# Patient Record
Sex: FEMALE | Race: WHITE | Employment: FULL TIME | URBAN - METROPOLITAN AREA
[De-identification: names, ages, dates, MRNs, and addresses within clinical notes are randomized per-mention and may not be internally consistent; named-entity substitution may affect disease eponyms.]

---

## 2017-06-13 ENCOUNTER — OFFICE VISIT (OUTPATIENT)
Dept: INTERNAL MEDICINE CLINIC | Facility: CLINIC | Age: 52
End: 2017-06-13

## 2017-06-13 VITALS
WEIGHT: 151.5 LBS | TEMPERATURE: 98 F | HEIGHT: 65 IN | DIASTOLIC BLOOD PRESSURE: 60 MMHG | HEART RATE: 80 BPM | BODY MASS INDEX: 25.24 KG/M2 | SYSTOLIC BLOOD PRESSURE: 118 MMHG | RESPIRATION RATE: 20 BRPM

## 2017-06-13 DIAGNOSIS — C50.911 MALIGNANT NEOPLASM OF RIGHT BREAST IN FEMALE, ESTROGEN RECEPTOR POSITIVE, UNSPECIFIED SITE OF BREAST (HCC): ICD-10-CM

## 2017-06-13 DIAGNOSIS — Z13.820 SCREENING FOR OSTEOPOROSIS: ICD-10-CM

## 2017-06-13 DIAGNOSIS — Z00.00 ROUTINE GENERAL MEDICAL EXAMINATION AT A HEALTH CARE FACILITY: Primary | ICD-10-CM

## 2017-06-13 DIAGNOSIS — Z17.0 MALIGNANT NEOPLASM OF RIGHT BREAST IN FEMALE, ESTROGEN RECEPTOR POSITIVE, UNSPECIFIED SITE OF BREAST (HCC): ICD-10-CM

## 2017-06-13 DIAGNOSIS — F51.01 PRIMARY INSOMNIA: ICD-10-CM

## 2017-06-13 DIAGNOSIS — M06.9 RHEUMATOID ARTHRITIS INVOLVING MULTIPLE SITES, UNSPECIFIED RHEUMATOID FACTOR PRESENCE: ICD-10-CM

## 2017-06-13 DIAGNOSIS — F39 MOOD DISORDER (HCC): ICD-10-CM

## 2017-06-13 PROCEDURE — 99386 PREV VISIT NEW AGE 40-64: CPT | Performed by: INTERNAL MEDICINE

## 2017-06-13 NOTE — PROGRESS NOTES
Patient presents with:  Establish Care: CPX      HPI: The pt is a 48 y/o WF, new patient, here to establish PCP and to undergo the female CPX minus the pap and minus the CBE. She is doing well.   Health goals center around staying healthy while improving f 30 capsule, Rfl: 0  •  HUMIRA 40 MG/0.8ML Subcutaneous Prefilled Syringe Kit, , Disp: , Rfl: 4  •  ibuprofen (MOTRIN) 800 MG Oral Tab, Take 800 mg by mouth every 6 (six) hours as needed. , Disp: , Rfl:       Smoking Status: Never Smoker regular and dynamic exercise, maintenance of ideal body weight, and reduction in daily stressors while making themselves more resilient in their everyday functioning to the best of their ability.   Patient verbally agrees to and understands the plan as outl

## 2017-07-05 ENCOUNTER — LAB ENCOUNTER (OUTPATIENT)
Dept: LAB | Age: 52
End: 2017-07-05
Attending: INTERNAL MEDICINE
Payer: COMMERCIAL

## 2017-07-05 DIAGNOSIS — Z00.00 ROUTINE GENERAL MEDICAL EXAMINATION AT A HEALTH CARE FACILITY: ICD-10-CM

## 2017-07-05 LAB
25-HYDROXYVITAMIN D (TOTAL): 31.4 NG/ML (ref 30–100)
ALBUMIN SERPL-MCNC: 3.7 G/DL (ref 3.5–4.8)
ALP LIVER SERPL-CCNC: 61 U/L (ref 41–108)
ALT SERPL-CCNC: 19 U/L (ref 14–54)
AST SERPL-CCNC: 16 U/L (ref 15–41)
BASOPHILS # BLD AUTO: 0.05 X10(3) UL (ref 0–0.1)
BASOPHILS NFR BLD AUTO: 1.1 %
BILIRUB SERPL-MCNC: 1.4 MG/DL (ref 0.1–2)
BILIRUB UR QL STRIP.AUTO: NEGATIVE
BUN BLD-MCNC: 12 MG/DL (ref 8–20)
CALCIUM BLD-MCNC: 8.4 MG/DL (ref 8.3–10.3)
CHLORIDE: 108 MMOL/L (ref 101–111)
CHOLEST SMN-MCNC: 157 MG/DL (ref ?–200)
CO2: 27 MMOL/L (ref 22–32)
COLOR UR AUTO: YELLOW
CREAT BLD-MCNC: 1.13 MG/DL (ref 0.55–1.02)
EOSINOPHIL # BLD AUTO: 0.16 X10(3) UL (ref 0–0.3)
EOSINOPHIL NFR BLD AUTO: 3.7 %
ERYTHROCYTE [DISTWIDTH] IN BLOOD BY AUTOMATED COUNT: 12.1 % (ref 11.5–16)
EST. AVERAGE GLUCOSE BLD GHB EST-MCNC: 103 MG/DL (ref 68–126)
GLUCOSE BLD-MCNC: 85 MG/DL (ref 70–99)
GLUCOSE UR STRIP.AUTO-MCNC: NEGATIVE MG/DL
HBA1C MFR BLD HPLC: 5.2 % (ref ?–5.7)
HCT VFR BLD AUTO: 38 % (ref 34–50)
HDLC SERPL-MCNC: 77 MG/DL (ref 45–?)
HDLC SERPL: 2.04 {RATIO} (ref ?–4.44)
HGB BLD-MCNC: 12.3 G/DL (ref 12–16)
IMMATURE GRANULOCYTE COUNT: 0.01 X10(3) UL (ref 0–1)
IMMATURE GRANULOCYTE RATIO %: 0.2 %
KETONES UR STRIP.AUTO-MCNC: NEGATIVE MG/DL
LDLC SERPL CALC-MCNC: 68 MG/DL (ref ?–130)
LEUKOCYTE ESTERASE UR QL STRIP.AUTO: NEGATIVE
LYMPHOCYTES # BLD AUTO: 1.34 X10(3) UL (ref 0.9–4)
LYMPHOCYTES NFR BLD AUTO: 30.7 %
M PROTEIN MFR SERPL ELPH: 7 G/DL (ref 6.1–8.3)
MCH RBC QN AUTO: 30.1 PG (ref 27–33.2)
MCHC RBC AUTO-ENTMCNC: 32.4 G/DL (ref 31–37)
MCV RBC AUTO: 93.1 FL (ref 81–100)
MONOCYTES # BLD AUTO: 0.35 X10(3) UL (ref 0.1–0.6)
MONOCYTES NFR BLD AUTO: 8 %
NEUTROPHIL ABS PRELIM: 2.45 X10 (3) UL (ref 1.3–6.7)
NEUTROPHILS # BLD AUTO: 2.45 X10(3) UL (ref 1.3–6.7)
NEUTROPHILS NFR BLD AUTO: 56.3 %
NITRITE UR QL STRIP.AUTO: NEGATIVE
NONHDLC SERPL-MCNC: 80 MG/DL (ref ?–130)
PH UR STRIP.AUTO: 5 [PH] (ref 4.5–8)
PLATELET # BLD AUTO: 182 10(3)UL (ref 150–450)
POTASSIUM SERPL-SCNC: 4 MMOL/L (ref 3.6–5.1)
PROT UR STRIP.AUTO-MCNC: NEGATIVE MG/DL
RBC # BLD AUTO: 4.08 X10(6)UL (ref 3.8–5.1)
RED CELL DISTRIBUTION WIDTH-SD: 41.5 FL (ref 35.1–46.3)
SODIUM SERPL-SCNC: 140 MMOL/L (ref 136–144)
SP GR UR STRIP.AUTO: 1.02 (ref 1–1.03)
TRIGLYCERIDES: 60 MG/DL (ref ?–150)
TSI SER-ACNC: 0.75 MIU/ML (ref 0.35–5.5)
UROBILINOGEN UR STRIP.AUTO-MCNC: <2 MG/DL
VLDL: 12 MG/DL (ref 5–40)
WBC # BLD AUTO: 4.4 X10(3) UL (ref 4–13)

## 2017-07-05 PROCEDURE — 82306 VITAMIN D 25 HYDROXY: CPT

## 2017-07-05 PROCEDURE — 84443 ASSAY THYROID STIM HORMONE: CPT

## 2017-07-05 PROCEDURE — 36415 COLL VENOUS BLD VENIPUNCTURE: CPT

## 2017-07-05 PROCEDURE — 80053 COMPREHEN METABOLIC PANEL: CPT

## 2017-07-05 PROCEDURE — 81001 URINALYSIS AUTO W/SCOPE: CPT

## 2017-07-05 PROCEDURE — 80061 LIPID PANEL: CPT

## 2017-07-05 PROCEDURE — 83036 HEMOGLOBIN GLYCOSYLATED A1C: CPT

## 2017-07-05 PROCEDURE — 85025 COMPLETE CBC W/AUTO DIFF WBC: CPT

## 2017-07-06 DIAGNOSIS — R31.21 ASYMPTOMATIC MICROSCOPIC HEMATURIA: ICD-10-CM

## 2017-07-06 DIAGNOSIS — R79.89 ELEVATED SERUM CREATININE: Primary | ICD-10-CM

## 2017-07-06 NOTE — PROGRESS NOTES
Repeat labs ordered for BUN and creatinine (b/c of elevated serum creatinine) and for U/A (b/c of microscopic hematuria). Jailene Underwood. Nicola Magallanes MD  Diplomate, American Board of Internal Medicine  [facility]  [address], [address], [facility] & [facility],

## 2017-07-10 ENCOUNTER — TELEPHONE (OUTPATIENT)
Dept: INTERNAL MEDICINE CLINIC | Facility: CLINIC | Age: 52
End: 2017-07-10

## 2017-07-10 DIAGNOSIS — C50.911 MALIGNANT NEOPLASM OF RIGHT BREAST IN FEMALE, ESTROGEN RECEPTOR POSITIVE, UNSPECIFIED SITE OF BREAST (HCC): Primary | ICD-10-CM

## 2017-07-10 DIAGNOSIS — Z17.0 MALIGNANT NEOPLASM OF RIGHT BREAST IN FEMALE, ESTROGEN RECEPTOR POSITIVE, UNSPECIFIED SITE OF BREAST (HCC): Primary | ICD-10-CM

## 2017-07-10 NOTE — TELEPHONE ENCOUNTER
Diagnostic mammogram ordered, notify pt. Diana Chaparro. Keri Guzman MD  Diplomate, American Board of Internal Medicine  [facility]  [address]  [facility],4Th Floor, [address], [facility], [address] [place] [address]  T: [phone]; F: Rosalinda 5

## 2017-08-02 ENCOUNTER — APPOINTMENT (OUTPATIENT)
Dept: LAB | Age: 52
End: 2017-08-02
Attending: INTERNAL MEDICINE
Payer: COMMERCIAL

## 2017-08-02 DIAGNOSIS — R79.89 ELEVATED SERUM CREATININE: ICD-10-CM

## 2017-08-02 DIAGNOSIS — R31.21 ASYMPTOMATIC MICROSCOPIC HEMATURIA: ICD-10-CM

## 2017-08-02 LAB
BILIRUB UR QL STRIP.AUTO: NEGATIVE
BUN BLD-MCNC: 14 MG/DL (ref 8–20)
COLOR UR AUTO: YELLOW
CREAT BLD-MCNC: 1.13 MG/DL (ref 0.55–1.02)
GLUCOSE UR STRIP.AUTO-MCNC: NEGATIVE MG/DL
HYALINE CASTS #/AREA URNS AUTO: PRESENT /LPF
KETONES UR STRIP.AUTO-MCNC: NEGATIVE MG/DL
LEUKOCYTE ESTERASE UR QL STRIP.AUTO: NEGATIVE
NITRITE UR QL STRIP.AUTO: NEGATIVE
PH UR STRIP.AUTO: 5 [PH] (ref 4.5–8)
PROT UR STRIP.AUTO-MCNC: NEGATIVE MG/DL
SP GR UR STRIP.AUTO: 1.02 (ref 1–1.03)
UROBILINOGEN UR STRIP.AUTO-MCNC: <2 MG/DL

## 2017-08-02 PROCEDURE — 84520 ASSAY OF UREA NITROGEN: CPT

## 2017-08-02 PROCEDURE — 36415 COLL VENOUS BLD VENIPUNCTURE: CPT

## 2017-08-02 PROCEDURE — 81001 URINALYSIS AUTO W/SCOPE: CPT

## 2017-08-02 PROCEDURE — 82565 ASSAY OF CREATININE: CPT

## 2017-08-04 ENCOUNTER — TELEPHONE (OUTPATIENT)
Dept: INTERNAL MEDICINE CLINIC | Facility: CLINIC | Age: 52
End: 2017-08-04

## 2017-08-04 NOTE — TELEPHONE ENCOUNTER
Pt called for test results. Please review. Pt c/o burning upon voiding. No c/o pain, hematuria or frequency.

## 2017-08-04 NOTE — TELEPHONE ENCOUNTER
Patient notified via [address] Sterling Juan. Graham Morales MD  Diplomate, American Board of Internal Medicine  [facility]  [address]  Counts include 234 beds at the [facility],4Th Floor, [address], [facility] & [facility], [address] [place] [address]  T: [phone]; F: Rosalinda 5

## 2017-08-24 ENCOUNTER — HOSPITAL ENCOUNTER (OUTPATIENT)
Dept: BONE DENSITY | Age: 52
Discharge: HOME | End: 2017-08-24
Attending: INTERNAL MEDICINE
Payer: COMMERCIAL

## 2017-08-24 ENCOUNTER — HOSPITAL ENCOUNTER (OUTPATIENT)
Dept: MAMMOGRAPHY | Age: 52
Discharge: HOME | End: 2017-08-24
Attending: INTERNAL MEDICINE
Payer: COMMERCIAL

## 2017-08-24 DIAGNOSIS — C50.911 MALIGNANT NEOPLASM OF RIGHT BREAST IN FEMALE, ESTROGEN RECEPTOR POSITIVE, UNSPECIFIED SITE OF BREAST (HCC): ICD-10-CM

## 2017-08-24 DIAGNOSIS — Z17.0 MALIGNANT NEOPLASM OF RIGHT BREAST IN FEMALE, ESTROGEN RECEPTOR POSITIVE, UNSPECIFIED SITE OF BREAST (HCC): ICD-10-CM

## 2017-08-24 DIAGNOSIS — M06.9 RHEUMATOID ARTHRITIS INVOLVING MULTIPLE SITES, UNSPECIFIED RHEUMATOID FACTOR PRESENCE: ICD-10-CM

## 2017-08-24 DIAGNOSIS — Z13.820 SCREENING FOR OSTEOPOROSIS: ICD-10-CM

## 2017-08-24 PROCEDURE — 77080 DXA BONE DENSITY AXIAL: CPT | Performed by: INTERNAL MEDICINE

## 2017-08-24 PROCEDURE — 77062 BREAST TOMOSYNTHESIS BI: CPT | Performed by: INTERNAL MEDICINE

## 2017-08-24 PROCEDURE — 77066 DX MAMMO INCL CAD BI: CPT | Performed by: INTERNAL MEDICINE

## 2017-09-07 PROCEDURE — 81015 MICROSCOPIC EXAM OF URINE: CPT | Performed by: UROLOGY

## 2017-09-07 RX ORDER — TEMAZEPAM 30 MG/1
CAPSULE ORAL
Qty: 30 CAPSULE | Refills: 0 | Status: SHIPPED | OUTPATIENT
Start: 2017-09-07 | End: 2017-12-21

## 2017-11-15 ENCOUNTER — OFFICE VISIT (OUTPATIENT)
Dept: FAMILY MEDICINE CLINIC | Facility: CLINIC | Age: 52
End: 2017-11-15

## 2017-11-15 VITALS
HEART RATE: 86 BPM | DIASTOLIC BLOOD PRESSURE: 74 MMHG | WEIGHT: 145 LBS | SYSTOLIC BLOOD PRESSURE: 114 MMHG | TEMPERATURE: 98 F | BODY MASS INDEX: 23.3 KG/M2 | RESPIRATION RATE: 16 BRPM | HEIGHT: 66 IN | OXYGEN SATURATION: 98 %

## 2017-11-15 DIAGNOSIS — J01.10 ACUTE NON-RECURRENT FRONTAL SINUSITIS: ICD-10-CM

## 2017-11-15 DIAGNOSIS — J04.0 LARYNGITIS: Primary | ICD-10-CM

## 2017-11-15 DIAGNOSIS — R05.9 COUGH: ICD-10-CM

## 2017-11-15 PROCEDURE — 99213 OFFICE O/P EST LOW 20 MIN: CPT | Performed by: PHYSICIAN ASSISTANT

## 2017-11-15 RX ORDER — METHYLPREDNISOLONE 4 MG/1
TABLET ORAL
Qty: 1 KIT | Refills: 0 | Status: SHIPPED | OUTPATIENT
Start: 2017-11-15 | End: 2017-12-19 | Stop reason: ALTCHOICE

## 2017-11-15 RX ORDER — BENZONATATE 200 MG/1
200 CAPSULE ORAL 3 TIMES DAILY PRN
Qty: 30 CAPSULE | Refills: 0 | Status: SHIPPED | OUTPATIENT
Start: 2017-11-15 | End: 2017-11-25

## 2017-11-15 RX ORDER — AMOXICILLIN AND CLAVULANATE POTASSIUM 875; 125 MG/1; MG/1
1 TABLET, FILM COATED ORAL 2 TIMES DAILY
Qty: 20 TABLET | Refills: 0 | Status: SHIPPED | OUTPATIENT
Start: 2017-11-15 | End: 2017-11-25

## 2017-11-15 NOTE — PROGRESS NOTES
CHIEF COMPLAINT:   Patient presents with:  Cough: loss of voice since Friday, Nasal congestion. HPI:   Emily Malik is a 47year old female who presents for URi sxs for  5 days.   Patient reports chills and sweats (improving), hoarse voice, sinus HA Comment: Needle Localization Right Breast Lumpectomy  No date: MELBA BIOPSY STEREOTACTIC NODULE 1 SITE RIGHT      Comment: 2014 2011: OTHER SURGICAL HISTORY      Comment: Cardiac ablation  2005: OTHER SURGICAL HISTORY      Comment: Cervical ablation  14 LYMPH:  No anterior cervical lymphadenopathy. No submandibular lymphadenopathy. No posterior cervical or occipital lymphadenopathy. No results found for this or any previous visit (from the past 24 hour(s)).     ASSESSMENT AND PLAN:   Leighann Vazquez is · Drink plenty of fluids to stay well hydrated. · Do not smoke  Follow-up care  Follow up with your healthcare provider or this facility if you have not improved after one week.   When to seek medical advice  Contact your healthcare provider for any of the Ty Walls will ask about your symptoms and health history. He or she will look at your ear, nose, and throat. You usually won't need to have X-rays taken.    The doctor may take a sample of mucus to check for bacteria.  If you have sinusitis that keeps co

## 2017-11-15 NOTE — PATIENT INSTRUCTIONS
-Cool mist humidifier at night  -Warm tea with honey  -Mucinex-D  -Flonase  -tylenol if needed for pain  -Go to ER with any worsening symptoms  -Antibiotic in 3 days if symptoms are not improving. Suspect viral at this point.           Laryngitis    Laryng Sinuses are air-filled spaces in the skull behind the face. They are kept moist and clean by a lining of mucosa. Things such as pollen, smoke, and chemical fumes can irritate the mucosa. It can then swell up.  As a response to irritation, the mucosa makes m © 0133-0944 The [address] [place], [facility] [place]. All rights reserved. This information is not intended as a substitute for professional medical care. Always follow your healthcare professional's instructions.

## 2017-12-19 ENCOUNTER — TELEPHONE (OUTPATIENT)
Dept: INTERNAL MEDICINE CLINIC | Facility: CLINIC | Age: 52
End: 2017-12-19

## 2017-12-19 PROCEDURE — 87624 HPV HI-RISK TYP POOLED RSLT: CPT | Performed by: OBSTETRICS & GYNECOLOGY

## 2017-12-19 PROCEDURE — 88175 CYTOPATH C/V AUTO FLUID REDO: CPT | Performed by: OBSTETRICS & GYNECOLOGY

## 2017-12-19 NOTE — TELEPHONE ENCOUNTER
Pt stated that she was seen by OBGYN today and discussed fluctuation in moods. OBGYN suggested that since pt is already taking Wellbutrin she be placed on a SSRI.      FYI she wanted to remind you that she can not take anything with estrogen d/t dx of b

## 2017-12-20 NOTE — TELEPHONE ENCOUNTER
Call patient. I last saw her in 6/2017. She would need an OV for f/u mood disorder to discuss options and other therapies. Can schedule w/ Ame Hilliard.   Please also gauge her interest in working w/ [facility] as cognitive behavioral therapy + prescription medicati

## 2017-12-21 RX ORDER — TEMAZEPAM 30 MG/1
CAPSULE ORAL
Qty: 30 CAPSULE | Refills: 0 | Status: SHIPPED | OUTPATIENT
Start: 2017-12-21 | End: 2018-03-27

## 2018-03-14 ENCOUNTER — APPOINTMENT (OUTPATIENT)
Dept: CT IMAGING | Age: 53
End: 2018-03-14
Attending: EMERGENCY MEDICINE
Payer: COMMERCIAL

## 2018-03-14 ENCOUNTER — HOSPITAL ENCOUNTER (EMERGENCY)
Age: 53
Discharge: HOME | End: 2018-03-14
Attending: EMERGENCY MEDICINE
Payer: COMMERCIAL

## 2018-03-14 VITALS
TEMPERATURE: 98 F | WEIGHT: 142 LBS | HEIGHT: 66 IN | RESPIRATION RATE: 16 BRPM | SYSTOLIC BLOOD PRESSURE: 126 MMHG | OXYGEN SATURATION: 99 % | DIASTOLIC BLOOD PRESSURE: 83 MMHG | HEART RATE: 85 BPM | BODY MASS INDEX: 22.82 KG/M2

## 2018-03-14 DIAGNOSIS — R06.00 DYSPNEA, UNSPECIFIED TYPE: Primary | ICD-10-CM

## 2018-03-14 LAB
ALBUMIN SERPL-MCNC: 3.8 G/DL (ref 3.5–4.8)
ALP LIVER SERPL-CCNC: 63 U/L (ref 41–108)
ALT SERPL-CCNC: 21 U/L (ref 14–54)
AST SERPL-CCNC: 17 U/L (ref 15–41)
ATRIAL RATE: 83 BPM
BASOPHILS # BLD AUTO: 0.06 X10(3) UL (ref 0–0.1)
BASOPHILS NFR BLD AUTO: 0.8 %
BILIRUB SERPL-MCNC: 1.1 MG/DL (ref 0.1–2)
BUN BLD-MCNC: 12 MG/DL (ref 8–20)
CALCIUM BLD-MCNC: 8.5 MG/DL (ref 8.3–10.3)
CHLORIDE: 105 MMOL/L (ref 101–111)
CO2: 25 MMOL/L (ref 22–32)
CREAT BLD-MCNC: 0.96 MG/DL (ref 0.55–1.02)
EOSINOPHIL # BLD AUTO: 0.24 X10(3) UL (ref 0–0.3)
EOSINOPHIL NFR BLD AUTO: 3.4 %
ERYTHROCYTE [DISTWIDTH] IN BLOOD BY AUTOMATED COUNT: 11.6 % (ref 11.5–16)
GLUCOSE BLD-MCNC: 91 MG/DL (ref 70–99)
HCT VFR BLD AUTO: 37.1 % (ref 34–50)
HGB BLD-MCNC: 12.8 G/DL (ref 12–16)
IMMATURE GRANULOCYTE COUNT: 0.02 X10(3) UL (ref 0–1)
IMMATURE GRANULOCYTE RATIO %: 0.3 %
LYMPHOCYTES # BLD AUTO: 2.25 X10(3) UL (ref 0.9–4)
LYMPHOCYTES NFR BLD AUTO: 31.5 %
M PROTEIN MFR SERPL ELPH: 7.2 G/DL (ref 6.1–8.3)
MCH RBC QN AUTO: 30.3 PG (ref 27–33.2)
MCHC RBC AUTO-ENTMCNC: 34.5 G/DL (ref 31–37)
MCV RBC AUTO: 87.9 FL (ref 81–100)
MONOCYTES # BLD AUTO: 0.35 X10(3) UL (ref 0.1–1)
MONOCYTES NFR BLD AUTO: 4.9 %
NEUTROPHIL ABS PRELIM: 4.23 X10 (3) UL (ref 1.3–6.7)
NEUTROPHILS # BLD AUTO: 4.23 X10(3) UL (ref 1.3–6.7)
NEUTROPHILS NFR BLD AUTO: 59.1 %
P AXIS: 67 DEGREES
P-R INTERVAL: 172 MS
PLATELET # BLD AUTO: 235 10(3)UL (ref 150–450)
POTASSIUM SERPL-SCNC: 3.8 MMOL/L (ref 3.6–5.1)
Q-T INTERVAL: 370 MS
QRS DURATION: 84 MS
QTC CALCULATION (BEZET): 434 MS
R AXIS: 80 DEGREES
RBC # BLD AUTO: 4.22 X10(6)UL (ref 3.8–5.1)
RED CELL DISTRIBUTION WIDTH-SD: 37.1 FL (ref 35.1–46.3)
SODIUM SERPL-SCNC: 139 MMOL/L (ref 136–144)
T AXIS: 41 DEGREES
TROPONIN: <0.046 NG/ML (ref ?–0.05)
VENTRICULAR RATE: 83 BPM
WBC # BLD AUTO: 7.2 X10(3) UL (ref 4–13)

## 2018-03-14 PROCEDURE — 93010 ELECTROCARDIOGRAM REPORT: CPT

## 2018-03-14 PROCEDURE — 99285 EMERGENCY DEPT VISIT HI MDM: CPT

## 2018-03-14 PROCEDURE — 93005 ELECTROCARDIOGRAM TRACING: CPT

## 2018-03-14 PROCEDURE — 36415 COLL VENOUS BLD VENIPUNCTURE: CPT

## 2018-03-14 PROCEDURE — 71275 CT ANGIOGRAPHY CHEST: CPT | Performed by: EMERGENCY MEDICINE

## 2018-03-14 PROCEDURE — 80053 COMPREHEN METABOLIC PANEL: CPT | Performed by: EMERGENCY MEDICINE

## 2018-03-14 PROCEDURE — 85025 COMPLETE CBC W/AUTO DIFF WBC: CPT | Performed by: EMERGENCY MEDICINE

## 2018-03-14 PROCEDURE — 84484 ASSAY OF TROPONIN QUANT: CPT | Performed by: EMERGENCY MEDICINE

## 2018-03-14 RX ORDER — CEPHALEXIN 500 MG/1
500 CAPSULE ORAL 4 TIMES DAILY
COMMUNITY
End: 2018-08-20 | Stop reason: ALTCHOICE

## 2018-03-14 RX ORDER — ASPIRIN 81 MG/1
324 TABLET, CHEWABLE ORAL ONCE
Status: COMPLETED | OUTPATIENT
Start: 2018-03-14 | End: 2018-03-14

## 2018-03-14 NOTE — ED PROVIDER NOTES
Patient Seen in: [facility] [place]    History   Patient presents with:  Chest Pain Angina (cardiovascular)    Stated Complaint: chest tightness/short of breath, recent abdom surgery    HPI    Patient is a 60-year-old female comes 14':  RADIATION RIGHT  3-4-14 [facility]: STEREOTACTIC BREAST BIOPSY      Comment: Right breast -- 12 o'clock position        Smoking status: Never Smoker                                                              Smokeless tobacco: Never Used I - Normal   CBC WITH DIFFERENTIAL WITH PLATELET    Narrative: The following orders were created for panel order CBC WITH DIFFERENTIAL WITH PLATELET.   Procedure                               Abnormality         Status                     --------- CONCLUSION:  1. Negative for pulmonary embolism. 2.  Limited opacification of the thoracic aorta. Dissection cannot be excluded. Normal caliber.     Dictated by: Gail Lowe MD on 3/14/2018 at 16:32     Approved by: Gail Lowe MD            ED Cours

## 2018-03-14 NOTE — ED INITIAL ASSESSMENT (HPI)
Reports chest tightness that started yesterday.  Recent abdominal surgery at [facility] on 2/28

## 2018-03-26 NOTE — TELEPHONE ENCOUNTER
Patient requesting refill for:  TEMAZEPAM 30 MG Oral Cap    Patient has been waiting for refill since 3/20. Request sent to incorrect pool.  Please address    To be sent to:  [address] [facility]

## 2018-03-27 NOTE — TELEPHONE ENCOUNTER
Temezepam 30 mg 1 cap daily filled 12-21-17 30 with 0 refills     LOV 6-13-17     Insomnia - Stable and no new issues. RTC 1 year or PRN.

## 2018-03-28 RX ORDER — TEMAZEPAM 30 MG/1
CAPSULE ORAL
Qty: 30 CAPSULE | Refills: 0 | Status: SHIPPED | OUTPATIENT
Start: 2018-03-28 | End: 2018-08-07

## 2018-03-28 RX ORDER — TEMAZEPAM 30 MG/1
CAPSULE ORAL
Qty: 30 CAPSULE | Refills: 0 | OUTPATIENT
Start: 2018-03-28

## 2018-04-09 ENCOUNTER — TELEPHONE (OUTPATIENT)
Dept: INTERNAL MEDICINE CLINIC | Facility: CLINIC | Age: 53
End: 2018-04-09

## 2018-04-09 NOTE — TELEPHONE ENCOUNTER
Pt c/o fatigue, chills and fever of 101.5 since yesterday. Pt with occassional non-productive cough. Taking otc Motrin. No c/o sore throat, ear pain, headache or n/v/d. Pt with no know exposure to illness.   Informed no appointments available today and

## 2018-04-09 NOTE — TELEPHONE ENCOUNTER
Patient called looking for nurse recommendation. Stated that she feels very ill and has had a fever since Saturday.  Please advise

## 2018-08-07 NOTE — TELEPHONE ENCOUNTER
Patient calling in requesting a refill for TEMAZEPAM 30 MG Oral Cap    To be sent to:   [address] [facility] [address], [address]), [phone], [phone]

## 2018-08-07 NOTE — TELEPHONE ENCOUNTER
Medication(s) to Refill:   Pending Prescriptions Disp Refills    temazepam 30 MG Oral Cap 30 capsule 0     Non protocol medication     Last Time Medication was Filled: 3/28/18 30 0R   Last Office Visit with PCP: 6/13/17   When Patient was Due Back to the O

## 2018-08-08 NOTE — TELEPHONE ENCOUNTER
Needs OV for Temazepam refill. Last OV w/ me was in 6/2017. Vincent Kay. Ani Wilson MD  Diplomate, American Board of Internal Medicine  [facility]  [address]  [facility],4Th Floor, [address], [facility], [address] [place] [address]  T: [phone]; F: Hafnargaeleti 5

## 2018-08-09 RX ORDER — TEMAZEPAM 30 MG/1
CAPSULE ORAL
Qty: 30 CAPSULE | Refills: 0 | Status: SHIPPED | OUTPATIENT
Start: 2018-08-09 | End: 2019-01-03

## 2018-08-20 NOTE — PATIENT INSTRUCTIONS
Danilo Dennis, great seeing you today! My show is called \"TO [address] [place] [address] DR. Lula Hearn" and it airs live on Facebook every Wednesday at 4 PM CST.   Podcast is available immediately thereafter on [employer] (search for [employer] WITH DR. Lula Hearn" within th

## 2018-08-20 NOTE — PROGRESS NOTES
Patient presents with:  Medication Follow-Up: Mood disorder      HPI: Francisco Marks presents today for f/u ongoing mood disorder.   She has been on Wellbutrin for quite some time now and with up-titration in dosing, but this medication has not resulted in sustained site of breast (hcc)    1. Mood disorder - D/C Wellbutrin. Start trial of Trintellix 5 mg daily. I gave her samples to last 2 weeks (she'll actually take 1/2 tablet of a 10 mg pill). I've instructed Emily to MyChart me in 2 weeks to give me a status.   Sherlyn Malone

## 2018-08-21 ENCOUNTER — PATIENT MESSAGE (OUTPATIENT)
Dept: INTERNAL MEDICINE CLINIC | Facility: CLINIC | Age: 53
End: 2018-08-21

## 2018-08-21 NOTE — TELEPHONE ENCOUNTER
From: Elsy Grande  To: Sunitha Villatoro MD  Sent: 8/21/2018 3:50 PM CDT  Subject: Prescription Question    Hi Dr. Manuel Olivas,   I started the 5mg of Trintellix you gave me yesterday.  I checked with my [facility] and unfortunately this is not a covered m

## 2018-08-22 NOTE — TELEPHONE ENCOUNTER
What about the Good Rx cards? Marisol Collado. Mely Alanis MD  Diplomate, American Board of Internal Medicine  [facility]  [address]  [facility],4Th Floor, [address], [facility] & [facility], [address] [place] [address]  T: [phone]; F: Rosalinda 5

## 2018-08-28 NOTE — TELEPHONE ENCOUNTER
Pt calling would like to know what she needs to do about Trintellix needs to know if she needs to change pharmacy in order to get medication approved

## 2018-08-28 NOTE — TELEPHONE ENCOUNTER
Pt was contacted. After Good Rx coupon medication will cost pt over $300. States she needs form filled out by Dr Gibson Hollins for financial assistance from . Fax # provided for Dr Gibson Hollins to take a look at the form.

## 2018-08-30 ENCOUNTER — TELEPHONE (OUTPATIENT)
Dept: INTERNAL MEDICINE CLINIC | Facility: CLINIC | Age: 53
End: 2018-08-30

## 2018-08-30 NOTE — TELEPHONE ENCOUNTER
Pt calling in to follow up on the form that was faxed over regarding a prescription that wasn't covered through pt's insurance. Pertaining to a discount card. Form was faxed on Monday, 08/27/18    RX: Trintellix       Please call pt with form status.

## 2018-09-11 ENCOUNTER — TELEPHONE (OUTPATIENT)
Dept: INTERNAL MEDICINE CLINIC | Facility: CLINIC | Age: 53
End: 2018-09-11

## 2018-09-11 NOTE — TELEPHONE ENCOUNTER
Would like to speak to nurse regarding her medication Trintellix, patient has questions regarding a discounted form that shes doing

## 2018-09-11 NOTE — TELEPHONE ENCOUNTER
Pt states Dr Tone Fermin portion on Trintellix form needs to be faxed to [place] from our office for them to except it. Faxed to [phone]. Confirmation received.

## 2018-09-12 NOTE — TELEPHONE ENCOUNTER
Received fax from patient assistance program with Clau Arndt stating patient is eligible to receive free medication through the program until 12/31/18 and will need to re enroll at that time.

## 2018-10-02 NOTE — PROGRESS NOTES
Patient presents with:  Medication Follow-Up      HPI: Amor Hicks presents today for f/u mood disorder. After her last visit w/ me, I started her on Trintellix 5 mg daily.   She's only had this medication for a full 2 weeks now as we were dealing with insurance nl mood/affect      A/P:  Mood disorder (hcc)  (primary encounter diagnosis)    1. Stable on Trintellix. 2. Continue support measures. 3. RTC 3 months. Francisco Marks verbally agrees to and understands the plan as outlined above.   She was also afforded the time

## 2018-10-02 NOTE — PATIENT INSTRUCTIONS
Carol Mcdonald,    1. See you in 3 months. 2. OK to increase Trintellix from 5 mg 's to 10 mg 's per day if needed somewhere toward the end of this month. Please give me a heads up if you do so via [employer]. 3. Success in health = Success in life. Jailene Underwood.  Emely

## 2018-10-30 ENCOUNTER — HOSPITAL ENCOUNTER (OUTPATIENT)
Dept: MAMMOGRAPHY | Age: 53
Discharge: HOME | End: 2018-10-30
Attending: INTERNAL MEDICINE
Payer: COMMERCIAL

## 2018-10-30 ENCOUNTER — TELEPHONE (OUTPATIENT)
Dept: INTERNAL MEDICINE CLINIC | Facility: CLINIC | Age: 53
End: 2018-10-30

## 2018-10-30 DIAGNOSIS — C50.911 MALIGNANT NEOPLASM OF RIGHT BREAST IN FEMALE, ESTROGEN RECEPTOR POSITIVE, UNSPECIFIED SITE OF BREAST (HCC): ICD-10-CM

## 2018-10-30 DIAGNOSIS — Z17.0 MALIGNANT NEOPLASM OF RIGHT BREAST IN FEMALE, ESTROGEN RECEPTOR POSITIVE, UNSPECIFIED SITE OF BREAST (HCC): ICD-10-CM

## 2018-10-30 PROCEDURE — 77066 DX MAMMO INCL CAD BI: CPT | Performed by: INTERNAL MEDICINE

## 2018-10-30 PROCEDURE — 77062 BREAST TOMOSYNTHESIS BI: CPT | Performed by: INTERNAL MEDICINE

## 2018-10-30 NOTE — TELEPHONE ENCOUNTER
Pt called to advise she will fax over form for prescription need to be completed by  and sent directly to company in order for pt to be able to continue med.

## 2018-11-08 NOTE — TELEPHONE ENCOUNTER
Approved Letter Case# [id number]    Free medications through the program unit 12-31-19 and will need to re-enroll at that time     After the initial shipment of medications for your patient you may do one of the following to reorder.     Fax a refill request o

## 2018-12-18 PROCEDURE — 87624 HPV HI-RISK TYP POOLED RSLT: CPT | Performed by: OBSTETRICS & GYNECOLOGY

## 2018-12-18 PROCEDURE — 88175 CYTOPATH C/V AUTO FLUID REDO: CPT | Performed by: OBSTETRICS & GYNECOLOGY

## 2018-12-20 ENCOUNTER — TELEPHONE (OUTPATIENT)
Dept: INTERNAL MEDICINE CLINIC | Facility: CLINIC | Age: 53
End: 2018-12-20

## 2018-12-20 DIAGNOSIS — N95.1 MENOPAUSE SYNDROME: ICD-10-CM

## 2018-12-20 RX ORDER — BUPROPION HYDROCHLORIDE 200 MG/1
TABLET, EXTENDED RELEASE ORAL
Qty: 180 TABLET | Refills: 1 | Status: SHIPPED | OUTPATIENT
Start: 2018-12-20 | End: 2019-08-06

## 2018-12-20 NOTE — TELEPHONE ENCOUNTER
Patient stated that she is currently taking Vortioxetine HBr (TRINTELLIX) 5 MG Oral Tab and she stated that she gained 8 pounds and her sex drive is low. She wants to know if Dr. Purnima Hammond can put her back on Wellbutrin .  [address]

## 2018-12-20 NOTE — TELEPHONE ENCOUNTER
Agreed. OK to stop Trintellix and to start back on Wellbutrin. Script sent to pharmacy, notify pt. Rajiv Chambers. Carlos Seth MD  Diplomate, American Board of Internal Medicine  [facility]  [address]  [address] [place] [address],4Th Floor, [address], [facility], [address]  T: [phone]

## 2018-12-20 NOTE — TELEPHONE ENCOUNTER
Pt states Trintellix has caused 8 pound weight gain since Sept and low sex drive. Requesting to go back on Wellbutrin 200 mg BID.

## 2019-01-03 RX ORDER — TEMAZEPAM 30 MG/1
30 CAPSULE ORAL NIGHTLY PRN
Qty: 30 CAPSULE | Refills: 0 | Status: SHIPPED | OUTPATIENT
Start: 2019-01-03 | End: 2019-05-31

## 2019-01-03 NOTE — TELEPHONE ENCOUNTER
Refill requested:   Requested Prescriptions     Pending Prescriptions Disp Refills   • TEMAZEPAM 30 MG Oral Cap [Pharmacy Med Name: TEMAZEPAM 30MG CAPSULES] 30 capsule 0     Sig: TAKE ONE CAPSULE BY MOUTH AT BEDTIME AS NEEDED FOR SLEEP       Failed protoco

## 2019-02-04 NOTE — TELEPHONE ENCOUNTER
Script needs to be printed and faxed to [phone] To Francesca BOO 10-2-18     Ekaterina Keith   to Leia Mcardle, MD           10/26/18 10:08 AM   Hi Dr. Meredith Richter,   I went up to 10mg on the Trintellix as we discussed at my last visit.  You wanted me to

## 2019-05-31 RX ORDER — TEMAZEPAM 30 MG/1
CAPSULE ORAL
Qty: 30 CAPSULE | Refills: 0 | Status: SHIPPED | OUTPATIENT
Start: 2019-05-31 | End: 2019-08-12

## 2019-05-31 NOTE — TELEPHONE ENCOUNTER
Failed protocol     Last refill:  1/3/2019 Temazepam 30 mg #30 NR    LOV:   10/2/2018 Dr Purnima Hammond RTC 3 months     No FOV scheduled

## 2019-08-06 DIAGNOSIS — N95.1 MENOPAUSE SYNDROME: ICD-10-CM

## 2019-08-06 RX ORDER — BUPROPION HYDROCHLORIDE 200 MG/1
TABLET, EXTENDED RELEASE ORAL
Qty: 180 TABLET | Refills: 0 | Status: SHIPPED | OUTPATIENT
Start: 2019-08-06 | End: 2020-07-09 | Stop reason: DRUGHIGH

## 2019-08-06 NOTE — TELEPHONE ENCOUNTER
Bupropion HCL ER, SR, 200 MG oral tablet    Last OV relevant to medication: 10/2/2018    Last refill date: 12/20/2018     #/refills: #180 w/ 1 refill     When pt was asked to return for OV: 3 months     Upcoming appt/reason: No future appointments

## 2019-08-12 RX ORDER — TEMAZEPAM 30 MG/1
CAPSULE ORAL
Qty: 30 CAPSULE | Refills: 2 | Status: SHIPPED | OUTPATIENT
Start: 2019-08-12 | End: 2020-02-17

## 2019-08-12 NOTE — TELEPHONE ENCOUNTER
Last OV: 10/2/18 with Dr. Keri Guzman  Last refill date: 5/31/19      #/refills: #30, 0 refills  When pt was asked to return for OV: 3 months  Upcoming appt/reason: no upcoming appt  Last labs 3/14/18

## 2019-08-12 NOTE — TELEPHONE ENCOUNTER
Faxed RX script (Temazepam 30mg) to [facility] ([facility] #[phone]).

## 2019-09-30 ENCOUNTER — TELEPHONE (OUTPATIENT)
Dept: INTERNAL MEDICINE CLINIC | Facility: CLINIC | Age: 54
End: 2019-09-30

## 2019-09-30 DIAGNOSIS — Z17.0 MALIGNANT NEOPLASM OF RIGHT BREAST IN FEMALE, ESTROGEN RECEPTOR POSITIVE, UNSPECIFIED SITE OF BREAST (HCC): Primary | ICD-10-CM

## 2019-09-30 DIAGNOSIS — C50.911 MALIGNANT NEOPLASM OF RIGHT BREAST IN FEMALE, ESTROGEN RECEPTOR POSITIVE, UNSPECIFIED SITE OF BREAST (HCC): Primary | ICD-10-CM

## 2019-10-02 NOTE — TELEPHONE ENCOUNTER
Order signed, notify pt. Diana Chaparro. Keri Guzman MD  Diplomate, American Board of Internal Medicine  Member, [employer] of [address] [facility]  [address]  [facility] [facility],4Th Floor, [address], [facility], [address] [place] [address]  T: [phone]; F: Cristianeti 5

## 2019-11-18 ENCOUNTER — TELEPHONE (OUTPATIENT)
Dept: INTERNAL MEDICINE CLINIC | Facility: CLINIC | Age: 54
End: 2019-11-18

## 2019-11-18 NOTE — TELEPHONE ENCOUNTER
Pt had lab work [facility] at [facility] on 11[facility]14/[facility]. Placed in your inbasket. Updated in Mat.

## 2019-11-18 NOTE — TELEPHONE ENCOUNTER
Results reviewed - TSH is slightly low, but other thyroid labs normal.  CBC, CMP, lipid panel, vitamin D, A1c normal.  Will send patient Mychart message and discuss further at physical.

## 2019-11-19 ENCOUNTER — HOSPITAL ENCOUNTER (OUTPATIENT)
Dept: MAMMOGRAPHY | Age: 54
Discharge: HOME | End: 2019-11-19
Attending: INTERNAL MEDICINE
Payer: COMMERCIAL

## 2019-11-19 DIAGNOSIS — C50.911 MALIGNANT NEOPLASM OF RIGHT BREAST IN FEMALE, ESTROGEN RECEPTOR POSITIVE, UNSPECIFIED SITE OF BREAST (HCC): ICD-10-CM

## 2019-11-19 DIAGNOSIS — Z17.0 MALIGNANT NEOPLASM OF RIGHT BREAST IN FEMALE, ESTROGEN RECEPTOR POSITIVE, UNSPECIFIED SITE OF BREAST (HCC): ICD-10-CM

## 2019-11-19 PROCEDURE — 77062 BREAST TOMOSYNTHESIS BI: CPT | Performed by: INTERNAL MEDICINE

## 2019-11-19 PROCEDURE — 77066 DX MAMMO INCL CAD BI: CPT | Performed by: INTERNAL MEDICINE

## 2019-12-03 ENCOUNTER — OFFICE VISIT (OUTPATIENT)
Dept: FAMILY MEDICINE CLINIC | Facility: CLINIC | Age: 54
End: 2019-12-03
Payer: COMMERCIAL

## 2019-12-03 VITALS
HEIGHT: 66 IN | OXYGEN SATURATION: 99 % | DIASTOLIC BLOOD PRESSURE: 70 MMHG | RESPIRATION RATE: 18 BRPM | TEMPERATURE: 98 F | WEIGHT: 154 LBS | SYSTOLIC BLOOD PRESSURE: 116 MMHG | HEART RATE: 87 BPM | BODY MASS INDEX: 24.75 KG/M2

## 2019-12-03 DIAGNOSIS — H66.001 NON-RECURRENT ACUTE SUPPURATIVE OTITIS MEDIA OF RIGHT EAR WITHOUT SPONTANEOUS RUPTURE OF TYMPANIC MEMBRANE: Primary | ICD-10-CM

## 2019-12-03 DIAGNOSIS — R05.9 COUGH: ICD-10-CM

## 2019-12-03 PROCEDURE — 99213 OFFICE O/P EST LOW 20 MIN: CPT | Performed by: NURSE PRACTITIONER

## 2019-12-03 RX ORDER — BENZONATATE 200 MG/1
200 CAPSULE ORAL 3 TIMES DAILY PRN
Qty: 20 CAPSULE | Refills: 0 | Status: SHIPPED | OUTPATIENT
Start: 2019-12-03 | End: 2019-12-10

## 2019-12-03 RX ORDER — AMOXICILLIN 875 MG/1
875 TABLET, COATED ORAL 2 TIMES DAILY
Qty: 20 TABLET | Refills: 0 | Status: SHIPPED | OUTPATIENT
Start: 2019-12-03 | End: 2019-12-13

## 2019-12-03 NOTE — PROGRESS NOTES
CHIEF COMPLAINT:   Patient presents with:  Cough: nasal congestion x 1 week, R ear pain x last night       HPI:   Cecilio Ochoa is a 49year old female who presents to clinic today with complaints of right ear pain. Has had for 1  days.  Pain is describ Alcohol/week: 3.0 standard drinks      Types: 3 Standard drinks or equivalent per week    Drug use: No       REVIEW OF SYSTEMS:   GENERAL: Feeling well otherwise.     SKIN: no unusual skin lesions or rashes  HEENT: See HPI  LUNGS: No cough, shortness of - amoxicillin 875 MG Oral Tab; Take 1 tablet (875 mg total) by mouth 2 (two) times daily for 10 days. Dispense: 20 tablet; Refill: 0    2. Cough  - benzonatate 200 MG Oral Cap;  Take 1 capsule (200 mg total) by mouth 3 (three) times daily as needed for cou · You may use over-the-counter medicine, such as acetaminophen or ibuprofen, to control pain and fever, unless something else was prescribed.  If you have chronic liver or kidney disease or have ever had a stomach ulcer or gastrointestinal bleeding, talk wi

## 2019-12-19 ENCOUNTER — OFFICE VISIT (OUTPATIENT)
Dept: INTERNAL MEDICINE CLINIC | Facility: CLINIC | Age: 54
End: 2019-12-19
Payer: COMMERCIAL

## 2019-12-19 VITALS
HEIGHT: 65 IN | TEMPERATURE: 98 F | WEIGHT: 150.75 LBS | HEART RATE: 80 BPM | RESPIRATION RATE: 12 BRPM | SYSTOLIC BLOOD PRESSURE: 110 MMHG | DIASTOLIC BLOOD PRESSURE: 74 MMHG | BODY MASS INDEX: 25.12 KG/M2

## 2019-12-19 DIAGNOSIS — F39 MOOD DISORDER (HCC): ICD-10-CM

## 2019-12-19 DIAGNOSIS — M06.9 RHEUMATOID ARTHRITIS INVOLVING MULTIPLE SITES, UNSPECIFIED RHEUMATOID FACTOR PRESENCE: ICD-10-CM

## 2019-12-19 DIAGNOSIS — F51.01 PRIMARY INSOMNIA: ICD-10-CM

## 2019-12-19 DIAGNOSIS — Z00.00 ENCOUNTER FOR PREVENTATIVE ADULT HEALTH CARE EXAMINATION: Primary | ICD-10-CM

## 2019-12-19 DIAGNOSIS — R79.89 ABNORMAL TSH: ICD-10-CM

## 2019-12-19 DIAGNOSIS — Z23 NEED FOR INFLUENZA VACCINATION: ICD-10-CM

## 2019-12-19 PROCEDURE — 99396 PREV VISIT EST AGE 40-64: CPT | Performed by: INTERNAL MEDICINE

## 2019-12-19 RX ORDER — FLUTICASONE PROPIONATE 50 MCG
2 SPRAY, SUSPENSION (ML) NASAL DAILY
COMMUNITY
End: 2020-03-06

## 2019-12-19 RX ORDER — BUPROPION HYDROCHLORIDE 300 MG/1
300 TABLET ORAL DAILY
Qty: 90 TABLET | Refills: 1 | Status: SHIPPED | OUTPATIENT
Start: 2019-12-19 | End: 2020-06-18

## 2019-12-19 NOTE — PATIENT INSTRUCTIONS
- We will repeat your thyroid labs in 1-2 months. You do not have to fast for this test.  Stop your multivitamin for at least 2 weeks before getting repeat thyroid labs done. - We will try the extended release Wellbutrin/bupropion.   You will take 1 table

## 2019-12-19 NOTE — PROGRESS NOTES
Faby Anaya is a 49year old female. HPI:   Patient presents with:  Physical: Declined Flu; Pt just finishes antibiotic treatment for ear infection. States she does still feel some popping in her right ear.  Also, she sometimes takes Buproprion 1 x da 40 MG/0.8ML Subcutaneous Prefilled Syringe Kit, Inject 40 mg into the skin every 21 days.   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 No clubbing, cyanosis or edema.   GI: soft non tender nondistended no h

## 2020-02-18 NOTE — TELEPHONE ENCOUNTER
Failed protocol       Last refill:  8/12/2019 Temazepam 30 mg #30 2R    LOV:   12/19/2019 Dr Brianne Gregory RTC 3-6 months with Dr Kelton Cox   No FOV scheduled

## 2020-02-19 NOTE — TELEPHONE ENCOUNTER
Call patient. I received a refill request for her Temazepam for sleep. I have no problem getting it to her for PRN use. My question is would she like this on a 30 or 90-day supply? Jyl Aid.  Leslie Hare MD  Diplomate, [address] [place] [address] Board of Internal Medicine  Me

## 2020-02-20 RX ORDER — TEMAZEPAM 30 MG/1
CAPSULE ORAL
Qty: 90 CAPSULE | Refills: 0 | Status: SHIPPED | OUTPATIENT
Start: 2020-02-20 | End: 2020-08-11

## 2020-03-06 ENCOUNTER — OFFICE VISIT (OUTPATIENT)
Dept: FAMILY MEDICINE CLINIC | Facility: CLINIC | Age: 55
End: 2020-03-06
Payer: COMMERCIAL

## 2020-03-06 VITALS
DIASTOLIC BLOOD PRESSURE: 84 MMHG | RESPIRATION RATE: 18 BRPM | WEIGHT: 152 LBS | TEMPERATURE: 99 F | HEIGHT: 66 IN | SYSTOLIC BLOOD PRESSURE: 120 MMHG | OXYGEN SATURATION: 99 % | HEART RATE: 82 BPM | BODY MASS INDEX: 24.43 KG/M2

## 2020-03-06 DIAGNOSIS — J01.00 ACUTE NON-RECURRENT MAXILLARY SINUSITIS: Primary | ICD-10-CM

## 2020-03-06 PROCEDURE — 99213 OFFICE O/P EST LOW 20 MIN: CPT | Performed by: NURSE PRACTITIONER

## 2020-03-06 RX ORDER — AMOXICILLIN AND CLAVULANATE POTASSIUM 875; 125 MG/1; MG/1
1 TABLET, FILM COATED ORAL 2 TIMES DAILY
Qty: 14 TABLET | Refills: 0 | Status: SHIPPED | OUTPATIENT
Start: 2020-03-06 | End: 2020-03-13

## 2020-03-06 RX ORDER — NEOMYCIN SULFATE, POLYMYXIN B SULFATE AND HYDROCORTISONE 10; 3.5; 1 MG/ML; MG/ML; [USP'U]/ML
SUSPENSION/ DROPS AURICULAR (OTIC)
COMMUNITY
Start: 2020-02-28 | End: 2020-07-09 | Stop reason: ALTCHOICE

## 2020-03-06 RX ORDER — BENZONATATE 200 MG/1
200 CAPSULE ORAL 3 TIMES DAILY PRN
Qty: 30 CAPSULE | Refills: 0 | Status: SHIPPED | OUTPATIENT
Start: 2020-03-06 | End: 2020-07-09 | Stop reason: ALTCHOICE

## 2020-03-06 NOTE — PROGRESS NOTES
CHIEF COMPLAINT:   Patient presents with:  Chest Congestion: s/s for 1 week  Cough: chest tightness  Post Nasal Drip: OTC meds taken      HPI:   Gabriela Silva is a 48year old female who presents for upper respiratory symptoms for  1 weeks.  Patient repor • OTHER SURGICAL HISTORY  2011    Cardiac ablation   • OTHER SURGICAL HISTORY  2005    Cervical ablation   • OTHER SURGICAL HISTORY Right 2017    Breast reconstruction    • RADIATION RIGHT  14'   • STEREOTACTIC BREAST BIOPSY  3-4-14 [facility]    Right south PLAN: Meds as below. Comfort care as described in Patient Instructions. To follow up for any worsening symptoms.     Meds & Refills for this Visit:  Requested Prescriptions      No prescriptions requested or ordered in this encounter       Risks, benefits, · You can use an over-the-counter decongestant, unless a similar medicine was prescribed to you. Nasal sprays work the fastest. Use one that contains phenylephrine or oxymetazoline. First blow your nose gently. Then use the spray.  Do not use these medicine · Don’t have close contact with people who have sore throats, colds, or other upper respiratory infections. · Don’t smoke, and stay away from secondhand smoke. · Stay up to date with of your vaccines.   Date Last Reviewed: 11/1/2017  © 6611-8610 [employer]

## 2020-03-17 ENCOUNTER — PATIENT MESSAGE (OUTPATIENT)
Dept: INTERNAL MEDICINE CLINIC | Facility: CLINIC | Age: 55
End: 2020-03-17

## 2020-03-17 ENCOUNTER — TELEPHONE (OUTPATIENT)
Dept: INTERNAL MEDICINE CLINIC | Facility: CLINIC | Age: 55
End: 2020-03-17

## 2020-03-17 RX ORDER — CODEINE PHOSPHATE AND GUAIFENESIN 10; 100 MG/5ML; MG/5ML
5 SOLUTION ORAL EVERY 6 HOURS PRN
Qty: 180 ML | Refills: 0 | Status: SHIPPED | OUTPATIENT
Start: 2020-03-17 | End: 2020-07-09 | Stop reason: ALTCHOICE

## 2020-03-17 RX ORDER — BUDESONIDE AND FORMOTEROL FUMARATE DIHYDRATE 80; 4.5 UG/1; UG/1
2 AEROSOL RESPIRATORY (INHALATION) 2 TIMES DAILY
Qty: 1 INHALER | Refills: 0 | Status: SHIPPED | OUTPATIENT
Start: 2020-03-17 | End: 2020-07-09 | Stop reason: ALTCHOICE

## 2020-03-17 NOTE — TELEPHONE ENCOUNTER
From: Rizwan Sanders  To: Leta Saldaña MD  Sent: 3/17/2020 8:02 AM CDT  Subject: Katie Farah been dealing with cold symptoms for about 3 weeks now. No fever. Feb.28 I went to [facility] clinic & prescribed me ear drops, told me had ear infection.  Following Amna

## 2020-03-17 NOTE — TELEPHONE ENCOUNTER
Patient has had ongoing URI symptoms and sinus issues from February. She would like to discuss with Dr Renetta Moran and have phone office visit. See Patient Message sent from triage.   Please call patient

## 2020-03-17 NOTE — TELEPHONE ENCOUNTER
I spoke w/ Katerina John. All questions answered. Will give her the followin. Cheratussin AC.  2. Symbicort. 3. Continue her Flonase as scheduled. Karen Salter.  Severo Obey, MD  Diplomate, [address] [place] [address] Board of Internal Medicine  Member, [facility]

## 2020-03-18 NOTE — TELEPHONE ENCOUNTER
Called pt to check on status, states she is doing much better on inhaler and with cough medicine. Discussed if symptoms worsen or no resolution in another week to notify office. Pt verbalizes understanding.

## 2020-03-24 NOTE — TELEPHONE ENCOUNTER
Patient calling in, stated she spoke with her Rheumatologist, Dr Oef Dee, and was recommended by him to taking RX Albuterol instead. Please call pt to discuss.

## 2020-03-25 RX ORDER — ALBUTEROL SULFATE 2.5 MG/3ML
2.5 SOLUTION RESPIRATORY (INHALATION) EVERY 6 HOURS PRN
Qty: 45 ML | Refills: 0 | Status: SHIPPED | OUTPATIENT
Start: 2020-03-25 | End: 2020-07-09 | Stop reason: ALTCHOICE

## 2020-03-25 NOTE — TELEPHONE ENCOUNTER
Please get an update on patient's status. My original message was dated 8 days ago with my treatment plan. Where is she at regarding symptoms? Alexa Prado.  Purnima Hammond MD  Diplomate, [facility] Board of Internal Medicine  Member, [facility]

## 2020-03-25 NOTE — TELEPHONE ENCOUNTER
Pt spoke her Rheumatologist Dr Brandie Delcid and stated he felt Albuterol via nebulizer would be a good choice to open pt's lungs up. Pt states she feels a lot better but remains with some tightness in her chest and thick secretions.   Also started Mucinex yester

## 2020-03-25 NOTE — TELEPHONE ENCOUNTER
1. Please verify if she has a nebulizer machine, tubing, mask, etc.  I'm assuming she does not, but I don't want to duplicate things. If she does not have such supplies, please pend orders. 2. Also, I went ahead and ordered up Albuterol nebs.     Zhao Chávez.

## 2020-06-17 DIAGNOSIS — F39 MOOD DISORDER (HCC): ICD-10-CM

## 2020-06-17 NOTE — TELEPHONE ENCOUNTER
Bupropion er xl 300 1 tab daily filled 12-19-19 90 with 1 refill     LOV 12-19-19   return to clinic in 3-6 months   No upcoming apt on file   Labs 11-14-19     LVM due for ov for further refills.

## 2020-06-18 RX ORDER — BUPROPION HYDROCHLORIDE 300 MG/1
TABLET ORAL
Qty: 90 TABLET | Refills: 1 | Status: SHIPPED | OUTPATIENT
Start: 2020-06-18 | End: 2021-01-16

## 2020-07-09 PROBLEM — I49.9 CARDIAC ARRHYTHMIA: Status: ACTIVE | Noted: 2019-06-04

## 2020-07-09 PROBLEM — I49.9 CARDIAC ARRHYTHMIA: Status: RESOLVED | Noted: 2019-06-04 | Resolved: 2020-07-09

## 2020-07-09 PROBLEM — D05.10 INTRADUCTAL CARCINOMA IN SITU OF BREAST: Status: ACTIVE | Noted: 2019-06-04

## 2020-07-09 PROBLEM — D05.10 INTRADUCTAL CARCINOMA IN SITU OF BREAST: Status: RESOLVED | Noted: 2019-06-04 | Resolved: 2020-07-09

## 2020-07-09 PROBLEM — M47.812 CERVICAL SPONDYLOSIS: Status: ACTIVE | Noted: 2019-09-19

## 2020-07-09 PROBLEM — D22.9 MULTIPLE BENIGN MELANOCYTIC NEVI: Status: ACTIVE | Noted: 2019-06-04

## 2020-07-09 PROCEDURE — 84443 ASSAY THYROID STIM HORMONE: CPT | Performed by: INTERNAL MEDICINE

## 2020-07-09 NOTE — PROGRESS NOTES
Sally Celis is a 48year old female. HPI:   Patient presents with: Follow - Up    Patient presents for follow up on chronic medical issues. Mood disorder - doing much better. Tolerating higher dose of bupropion without any issues.   Rheumatoid arth ; stereotactic breast biopsy (3-4-14 [facility]); radiation right (14'); raffy biopsy stereo nodule 1 site right (cpt=19081); colonoscopy,diagnostic (N/A, 2016); other surgical history (); other surgical history (); other surgical histor consider titrating back down to 150 mg qd dosing if she is still doing well at next visit.     2. Rheumatoid arthritis involving multiple sites, unspecified rheumatoid factor presence ([facility] [address]  Follows with Rheumatology, on Humira, though running into some ins

## 2020-07-09 NOTE — PATIENT INSTRUCTIONS
- We will check thyroid labs today. - Continue bupropion for now at the current dose  - Schedule mammogram for November 19th or later [phone]  - Get fasting blood work done in November as well.   - Follow up with Dr. Antoinette Schuster for your pap smear some t

## 2020-08-11 RX ORDER — TEMAZEPAM 30 MG/1
CAPSULE ORAL
Qty: 90 CAPSULE | Refills: 0 | Status: SHIPPED | OUTPATIENT
Start: 2020-08-11 | End: 2021-02-11

## 2020-08-11 NOTE — TELEPHONE ENCOUNTER
No protocol     Last refill:  2/20/2020 Temazepam 30 mg #90 NR    LOV:   7/9/2020 Dr Lisa Walker RTC 1 year   3.  Primary insomnia  Stable, continue PRN temazepam.  No FOV scheduled

## 2020-09-30 ENCOUNTER — TELEPHONE (OUTPATIENT)
Dept: INTERNAL MEDICINE CLINIC | Facility: CLINIC | Age: 55
End: 2020-09-30

## 2020-09-30 ENCOUNTER — OFFICE VISIT (OUTPATIENT)
Dept: FAMILY MEDICINE CLINIC | Facility: CLINIC | Age: 55
End: 2020-09-30
Payer: COMMERCIAL

## 2020-09-30 VITALS
RESPIRATION RATE: 16 BRPM | HEART RATE: 78 BPM | TEMPERATURE: 98 F | DIASTOLIC BLOOD PRESSURE: 82 MMHG | SYSTOLIC BLOOD PRESSURE: 120 MMHG | HEIGHT: 65 IN | BODY MASS INDEX: 25.99 KG/M2 | OXYGEN SATURATION: 99 % | WEIGHT: 156 LBS

## 2020-09-30 DIAGNOSIS — N30.00 ACUTE CYSTITIS WITHOUT HEMATURIA: Primary | ICD-10-CM

## 2020-09-30 PROCEDURE — 3079F DIAST BP 80-89 MM HG: CPT | Performed by: NURSE PRACTITIONER

## 2020-09-30 PROCEDURE — 99213 OFFICE O/P EST LOW 20 MIN: CPT | Performed by: NURSE PRACTITIONER

## 2020-09-30 PROCEDURE — 3008F BODY MASS INDEX DOCD: CPT | Performed by: NURSE PRACTITIONER

## 2020-09-30 PROCEDURE — 87086 URINE CULTURE/COLONY COUNT: CPT | Performed by: NURSE PRACTITIONER

## 2020-09-30 PROCEDURE — 3074F SYST BP LT 130 MM HG: CPT | Performed by: NURSE PRACTITIONER

## 2020-09-30 PROCEDURE — 87077 CULTURE AEROBIC IDENTIFY: CPT | Performed by: NURSE PRACTITIONER

## 2020-09-30 PROCEDURE — 87186 SC STD MICRODIL/AGAR DIL: CPT | Performed by: NURSE PRACTITIONER

## 2020-09-30 RX ORDER — NITROFURANTOIN 25; 75 MG/1; MG/1
100 CAPSULE ORAL 2 TIMES DAILY
Qty: 10 CAPSULE | Refills: 0 | Status: SHIPPED | OUTPATIENT
Start: 2020-09-30 | End: 2020-10-05

## 2020-09-30 NOTE — TELEPHONE ENCOUNTER
Patient called and stated that she is having UTI symptoms. She wants to know if something could be called into the pharmacy. She stated that she had a UTI over month ago and it came back.  Needs to be sent to [place] [address] [address] [facility],  [address], [place] [address]

## 2020-09-30 NOTE — PROGRESS NOTES
CHIEF COMPLAINT:   Patient presents with:  UTI: abd pressure, frequency, pain when urinating, odor, x sunday       HPI:   Cecilio Ochoa is a 48year old female who presents with symptoms of UTI.  Complaining of urinary frequency, urgency, dysuria, bladd /82 (BP Location: Right arm, Patient Position: Sitting, Cuff Size: adult)   Pulse 78   Temp 98 °F (36.7 °C)   Resp 16   Ht 65\"   Wt 156 lb (70.8 kg)   SpO2 99%   BMI 25.96 kg/m²   GENERAL: well developed, well nourished,in no apparent distress  CARD The terms bladder infection, UTI, and cystitis are often used to describe the same thing. But they are not always the same. Cystitis is an inflammation of the bladder. The most common cause of cystitis is an infection.   Symptoms  The infection causes infla · Take antibiotics until they are used up, even if you feel better. It's important to finish them to make sure the infection has cleared. · You can use acetaminophen or ibuprofen for pain, fever, or discomfort, unless another medicine was prescribed.  If y If a culture was done, you will be told if your treatment needs to be changed. If directed, you can call to find out the results. If X-rays were done, you will be told if the results will affect your treatment.   Call 911  Call 911 if any of the following

## 2020-09-30 NOTE — PATIENT INSTRUCTIONS
Bladder Infection, Female (Adult)     Urine normally doesn't have any germs (bacteria) in it. But bacteria can get into the urinary tract from the skin around the rectum. Or they can travel in the blood from other parts of the body.  Once they are in your · Wiping incorrectly after urinating. Always wipe from front to back. · Bowel incontinence  · Pregnancy  · Procedures such as having a catheter put in  · Older age  · Not emptying your bladder. This can give bacteria a chance to grow in your urine.   · Flu · Improve your diet and prevent constipation. Eat more fresh fruits and vegetables, and fiber. Eat less junk foods and fatty foods. · Don't have sex until your symptoms are gone. · Don't have caffeine, alcohol, and spicy foods.  These can irritate your bl Urine normally doesn't have any germs (bacteria) in it. But bacteria can get into the urinary tract from the skin around the rectum. Or they can travel in the blood from other parts of the body.  Once they are in your urinary tract, they can cause infection · Bowel incontinence  · Pregnancy  · Procedures such as having a catheter put in  · Older age  · Not emptying your bladder. This can give bacteria a chance to grow in your urine.   · Fluid loss (dehydration)  · Constipation  · Having sex  · Using a diaphrag · Don't have sex until your symptoms are gone. · Don't have caffeine, alcohol, and spicy foods. These can irritate your bladder. · Urinate right after you have sex to flush out your bladder.   · If you use birth control pills and have frequent bladder inf

## 2020-09-30 NOTE — TELEPHONE ENCOUNTER
Pt informed Dr April Crespo currently not in the office today and would need an in-office visit to have urine sample and r/o uti. Offered ov for today and pt declined due to work schedule.  Pt stated will call ob-gyn, otherwise, pt was advised to proceed to a

## 2020-11-19 ENCOUNTER — HOSPITAL ENCOUNTER (OUTPATIENT)
Dept: MAMMOGRAPHY | Age: 55
Discharge: HOME | End: 2020-11-19
Attending: INTERNAL MEDICINE
Payer: COMMERCIAL

## 2020-11-19 DIAGNOSIS — Z12.31 ENCOUNTER FOR SCREENING MAMMOGRAM FOR MALIGNANT NEOPLASM OF BREAST: ICD-10-CM

## 2020-11-19 PROCEDURE — 77067 SCR MAMMO BI INCL CAD: CPT | Performed by: INTERNAL MEDICINE

## 2020-11-19 PROCEDURE — 77063 BREAST TOMOSYNTHESIS BI: CPT | Performed by: INTERNAL MEDICINE

## 2021-01-15 DIAGNOSIS — F39 MOOD DISORDER (HCC): ICD-10-CM

## 2021-01-15 RX ORDER — BUPROPION HYDROCHLORIDE 300 MG/1
TABLET ORAL
Qty: 90 TABLET | Refills: 1 | OUTPATIENT
Start: 2021-01-15

## 2021-01-15 NOTE — TELEPHONE ENCOUNTER
Medication(s) to Refill:   Requested Prescriptions     Pending Prescriptions Disp Refills   • BUPROPION HCL ER, XL, 300 MG Oral Tablet 24 Hr [Pharmacy Med Name: BUPROPION XL 300MG TABLETS] 90 tablet 1     Sig: TAKE 1 TABLET BY MOUTH DAILY       LOV:  7

## 2021-01-16 RX ORDER — BUPROPION HYDROCHLORIDE 300 MG/1
300 TABLET ORAL DAILY
Qty: 90 TABLET | Refills: 0 | Status: SHIPPED | OUTPATIENT
Start: 2021-01-16 | End: 2021-03-22

## 2021-01-18 NOTE — TELEPHONE ENCOUNTER
Your Appointments    Monday February 15, 2021  6:30 PM  Follow Up Visit with Graham Anguiano MD  [address] [facility] [address] ([facility]) [address] [place] [address]  [address] [place] [address] [phone]

## 2021-02-11 RX ORDER — TEMAZEPAM 30 MG/1
CAPSULE ORAL
Qty: 90 CAPSULE | Refills: 0 | Status: SHIPPED | OUTPATIENT
Start: 2021-02-11 | End: 2021-07-30

## 2021-03-22 ENCOUNTER — OFFICE VISIT (OUTPATIENT)
Dept: INTERNAL MEDICINE CLINIC | Facility: CLINIC | Age: 56
End: 2021-03-22
Payer: COMMERCIAL

## 2021-03-22 VITALS
DIASTOLIC BLOOD PRESSURE: 80 MMHG | SYSTOLIC BLOOD PRESSURE: 118 MMHG | HEIGHT: 65 IN | OXYGEN SATURATION: 100 % | WEIGHT: 151 LBS | HEART RATE: 82 BPM | BODY MASS INDEX: 25.16 KG/M2 | RESPIRATION RATE: 16 BRPM | TEMPERATURE: 98 F

## 2021-03-22 DIAGNOSIS — F39 MOOD DISORDER (HCC): ICD-10-CM

## 2021-03-22 DIAGNOSIS — M06.9 RHEUMATOID ARTHRITIS INVOLVING MULTIPLE SITES, UNSPECIFIED WHETHER RHEUMATOID FACTOR PRESENT (HCC): Chronic | ICD-10-CM

## 2021-03-22 DIAGNOSIS — Z00.00 ENCOUNTER FOR PREVENTATIVE ADULT HEALTH CARE EXAMINATION: Primary | ICD-10-CM

## 2021-03-22 DIAGNOSIS — F51.01 PRIMARY INSOMNIA: ICD-10-CM

## 2021-03-22 PROBLEM — U07.1 COVID-19: Status: ACTIVE | Noted: 2021-02-22

## 2021-03-22 PROCEDURE — 3079F DIAST BP 80-89 MM HG: CPT | Performed by: INTERNAL MEDICINE

## 2021-03-22 PROCEDURE — 3008F BODY MASS INDEX DOCD: CPT | Performed by: INTERNAL MEDICINE

## 2021-03-22 PROCEDURE — 3074F SYST BP LT 130 MM HG: CPT | Performed by: INTERNAL MEDICINE

## 2021-03-22 PROCEDURE — 99396 PREV VISIT EST AGE 40-64: CPT | Performed by: INTERNAL MEDICINE

## 2021-03-22 RX ORDER — ESCITALOPRAM OXALATE 10 MG/1
10 TABLET ORAL DAILY
Qty: 30 TABLET | Refills: 1 | Status: SHIPPED | OUTPATIENT
Start: 2021-03-22 | End: 2021-05-14

## 2021-03-22 NOTE — PATIENT INSTRUCTIONS
- Taper off the bupropion/Wellbutrin. Take 1/2 tablet daily for two weeks, then 1/2 tablet every other day for two weeks, then stop  - Start new medication, escitalopram (Lexapro) in 1 month after you have tapered off the Wellbutrin. Take 1 tablet daily.

## 2021-03-23 PROBLEM — F39 MOOD DISORDER (HCC): Chronic | Status: ACTIVE | Noted: 2017-06-13

## 2021-03-23 PROBLEM — F39 MOOD DISORDER: Chronic | Status: ACTIVE | Noted: 2017-06-13

## 2021-03-23 PROBLEM — M06.9 RA (RHEUMATOID ARTHRITIS) (HCC): Chronic | Status: ACTIVE | Noted: 2017-06-13

## 2021-04-09 ENCOUNTER — TELEPHONE (OUTPATIENT)
Dept: INTERNAL MEDICINE CLINIC | Facility: CLINIC | Age: 56
End: 2021-04-09

## 2021-04-09 NOTE — TELEPHONE ENCOUNTER
Incoming lab results from [facility] on 4/8/21  Placed fax in your inbasket. Updated in [address]

## 2021-07-30 NOTE — TELEPHONE ENCOUNTER
temazepam 30 MG Oral Cap          Sig: TAKE 1 CAPSULE BY MOUTH AT BEDTIME AS NEEDED FOR SLEEP    Disp:  90 capsule    Refills:  0    Start: 7/30/2021    Class: Normal    Non-formulary    Last ordered: 5 months ago by Aminata Roach MD         escitalopram

## 2021-08-16 NOTE — PROGRESS NOTES
Jessy Max is a 55year old female here today for a telemedicine/video visit. Patient is in the state of [place] during this visit. Jessy Max verbally consents to a Video visit service on 08/16/21.   Patient understands and accepts financial re dose to 1/2 tablet (5 mg) nightly. Advised her to send a [employer] message with an update in 3-4 weeks.   If she is still having side effects or mood symptoms worsen on lower dose may consider going back to bupropion or trying SNRI such as venlafaxine or dul

## 2021-09-25 ENCOUNTER — HOSPITAL ENCOUNTER (EMERGENCY)
Age: 56
Discharge: HOME | End: 2021-09-25
Attending: EMERGENCY MEDICINE
Payer: COMMERCIAL

## 2021-09-25 ENCOUNTER — APPOINTMENT (OUTPATIENT)
Dept: GENERAL RADIOLOGY | Age: 56
End: 2021-09-25
Attending: NURSE PRACTITIONER
Payer: COMMERCIAL

## 2021-09-25 VITALS
RESPIRATION RATE: 16 BRPM | WEIGHT: 148 LBS | TEMPERATURE: 98 F | HEART RATE: 73 BPM | SYSTOLIC BLOOD PRESSURE: 157 MMHG | HEIGHT: 65 IN | BODY MASS INDEX: 24.66 KG/M2 | OXYGEN SATURATION: 100 % | DIASTOLIC BLOOD PRESSURE: 82 MMHG

## 2021-09-25 DIAGNOSIS — S62.616B OPEN DISPLACED FRACTURE OF PROXIMAL PHALANX OF RIGHT LITTLE FINGER, INITIAL ENCOUNTER: ICD-10-CM

## 2021-09-25 DIAGNOSIS — S61.421A LACERATION OF RIGHT HAND WITH FOREIGN BODY, INITIAL ENCOUNTER: Primary | ICD-10-CM

## 2021-09-25 PROCEDURE — 99284 EMERGENCY DEPT VISIT MOD MDM: CPT

## 2021-09-25 PROCEDURE — 90471 IMMUNIZATION ADMIN: CPT

## 2021-09-25 PROCEDURE — 26720 TREAT FINGER FRACTURE EACH: CPT

## 2021-09-25 PROCEDURE — 96365 THER/PROPH/DIAG IV INF INIT: CPT

## 2021-09-25 PROCEDURE — 73130 X-RAY EXAM OF HAND: CPT | Performed by: NURSE PRACTITIONER

## 2021-09-25 RX ORDER — CEFADROXIL 500 MG/1
500 CAPSULE ORAL 2 TIMES DAILY
Qty: 14 CAPSULE | Refills: 0 | Status: SHIPPED | OUTPATIENT
Start: 2021-09-25 | End: 2021-10-02

## 2021-09-25 RX ORDER — HYDROCODONE BITARTRATE AND ACETAMINOPHEN 5; 325 MG/1; MG/1
1 TABLET ORAL EVERY 6 HOURS PRN
Qty: 10 TABLET | Refills: 0 | Status: SHIPPED | OUTPATIENT
Start: 2021-09-25

## 2021-09-25 NOTE — ED PROVIDER NOTES
Patient Seen in: [facility] In [place]      History   Patient presents with:  Arm or Hand Injury    Stated Complaint: cut left hand s/p fall    Subjective:   64-year-old female presents today with history of fall when she landed o Never used    Alcohol use:  Yes      Alcohol/week: 3.0 standard drinks      Types: 3 Standard drinks or equivalent per week    Drug use: Never             Review of Systems    Positive for stated complaint: cut left hand s/p fall  Other systems are as noted FINDINGS:  Transverse minimally displaced fracture involves the base of the 5th proximal phalanx and is impacted by approximately 4 mm. Fracture does not extend to the metacarpal phalangeal joint. Surrounding soft tissue swelling with laceration.   No dis and gauze dressing applied  Tetanus status: Updated today  Antiobitic prophylaxis: Ancef 1 dose in the ER. Duricef at home  Follow-up with Ortho hand in 2 days for wound check and further evaluation  Wound care instructions given.  Keep affected area keep

## 2021-09-27 PROBLEM — S62.606D: Status: ACTIVE | Noted: 2021-09-27

## 2021-09-27 PROBLEM — S61.011A LACERATION OF RIGHT THUMB WITHOUT FOREIGN BODY WITHOUT DAMAGE TO NAIL, INITIAL ENCOUNTER: Status: ACTIVE | Noted: 2021-09-27

## 2021-10-05 DIAGNOSIS — F39 MOOD DISORDER (HCC): ICD-10-CM

## 2021-10-06 RX ORDER — DULOXETIN HYDROCHLORIDE 30 MG/1
CAPSULE, DELAYED RELEASE ORAL
Qty: 30 CAPSULE | Refills: 5 | Status: SHIPPED | OUTPATIENT
Start: 2021-10-06

## 2021-10-06 NOTE — TELEPHONE ENCOUNTER
LOV: 8/16/2021 with Dr. Bal Jimenez   RTC: 3-6 months  Last Relevant Labs: 4/8/2021   Filled: 9/7/2021   #30 with 0 refills    No future appointments.

## 2021-12-01 ENCOUNTER — TELEPHONE (OUTPATIENT)
Dept: INTERNAL MEDICINE CLINIC | Facility: CLINIC | Age: 56
End: 2021-12-01

## 2021-12-01 DIAGNOSIS — Z12.31 ENCOUNTER FOR SCREENING MAMMOGRAM FOR MALIGNANT NEOPLASM OF BREAST: Primary | ICD-10-CM

## 2021-12-01 NOTE — TELEPHONE ENCOUNTER
CS called stating the pt scheduled her mammogram on [employer], please place order.     Future Appointments   Date Time Provider Mildred Dennis   12/29/2021  7:00 PM PF MELBA RM1 PF MAMMO [place]

## 2021-12-29 ENCOUNTER — HOSPITAL ENCOUNTER (OUTPATIENT)
Dept: MAMMOGRAPHY | Age: 56
Discharge: HOME | End: 2021-12-29
Attending: INTERNAL MEDICINE
Payer: COMMERCIAL

## 2021-12-29 DIAGNOSIS — Z12.31 ENCOUNTER FOR SCREENING MAMMOGRAM FOR MALIGNANT NEOPLASM OF BREAST: ICD-10-CM

## 2021-12-29 PROCEDURE — 77063 BREAST TOMOSYNTHESIS BI: CPT | Performed by: INTERNAL MEDICINE

## 2021-12-29 PROCEDURE — 77067 SCR MAMMO BI INCL CAD: CPT | Performed by: INTERNAL MEDICINE

## 2022-02-10 RX ORDER — TEMAZEPAM 30 MG/1
CAPSULE ORAL
Qty: 90 CAPSULE | Refills: 0 | Status: SHIPPED | OUTPATIENT
Start: 2022-02-10

## 2022-02-10 NOTE — TELEPHONE ENCOUNTER
cpx appointment made with patient for 3/28     No Protocol     Requesting: temazepam 30mg     LOV: 8/16/21   2.  Primary insomnia  Continue with PRN temazepam  RTC: 3-6 months   Filled: 7/30/21 #90 1 refill   Recent Labs: 4/8/21     Upcoming OV: 3/28

## 2022-03-28 ENCOUNTER — OFFICE VISIT (OUTPATIENT)
Dept: INTERNAL MEDICINE CLINIC | Facility: CLINIC | Age: 57
End: 2022-03-28
Payer: COMMERCIAL

## 2022-03-28 VITALS
RESPIRATION RATE: 12 BRPM | OXYGEN SATURATION: 100 % | HEIGHT: 64.75 IN | HEART RATE: 72 BPM | SYSTOLIC BLOOD PRESSURE: 124 MMHG | DIASTOLIC BLOOD PRESSURE: 82 MMHG | TEMPERATURE: 99 F | WEIGHT: 162 LBS | BODY MASS INDEX: 27.32 KG/M2

## 2022-03-28 DIAGNOSIS — F51.01 PRIMARY INSOMNIA: ICD-10-CM

## 2022-03-28 DIAGNOSIS — Z00.00 ENCOUNTER FOR PREVENTATIVE ADULT HEALTH CARE EXAMINATION: Primary | ICD-10-CM

## 2022-03-28 DIAGNOSIS — M54.50 CHRONIC BILATERAL LOW BACK PAIN WITHOUT SCIATICA: ICD-10-CM

## 2022-03-28 DIAGNOSIS — Z12.31 ENCOUNTER FOR SCREENING MAMMOGRAM FOR MALIGNANT NEOPLASM OF BREAST: ICD-10-CM

## 2022-03-28 DIAGNOSIS — G89.29 CHRONIC BILATERAL LOW BACK PAIN WITHOUT SCIATICA: ICD-10-CM

## 2022-03-28 DIAGNOSIS — F39 MOOD DISORDER (HCC): ICD-10-CM

## 2022-03-28 PROBLEM — U07.1 COVID-19: Status: RESOLVED | Noted: 2021-02-22 | Resolved: 2022-03-28

## 2022-03-28 PROBLEM — S61.011A LACERATION OF RIGHT THUMB WITHOUT FOREIGN BODY WITHOUT DAMAGE TO NAIL, INITIAL ENCOUNTER: Status: RESOLVED | Noted: 2021-09-27 | Resolved: 2022-03-28

## 2022-03-28 PROBLEM — M06.9 RHEUMATOID ARTHRITIS OF FOOT (HCC): Status: ACTIVE | Noted: 2021-09-21

## 2022-03-28 PROBLEM — S62.606D: Status: RESOLVED | Noted: 2021-09-27 | Resolved: 2022-03-28

## 2022-03-28 PROCEDURE — 3074F SYST BP LT 130 MM HG: CPT | Performed by: INTERNAL MEDICINE

## 2022-03-28 PROCEDURE — 3008F BODY MASS INDEX DOCD: CPT | Performed by: INTERNAL MEDICINE

## 2022-03-28 PROCEDURE — 99396 PREV VISIT EST AGE 40-64: CPT | Performed by: INTERNAL MEDICINE

## 2022-03-28 PROCEDURE — 3079F DIAST BP 80-89 MM HG: CPT | Performed by: INTERNAL MEDICINE

## 2022-03-28 RX ORDER — ESCITALOPRAM OXALATE 10 MG/1
TABLET ORAL
COMMUNITY
Start: 2021-07-30 | End: 2022-03-28

## 2022-03-28 RX ORDER — TRIAMCINOLONE ACETONIDE 40 MG/ML
INJECTION, SUSPENSION INTRA-ARTICULAR; INTRAMUSCULAR
COMMUNITY
Start: 2022-03-02 | End: 2022-03-28 | Stop reason: ALTCHOICE

## 2022-03-28 RX ORDER — BUPROPION HYDROCHLORIDE 300 MG/1
300 TABLET ORAL DAILY
Qty: 30 TABLET | Refills: 0 | Status: SHIPPED | OUTPATIENT
Start: 2022-03-28

## 2022-03-28 RX ORDER — SODIUM FLUORIDE 5 MG/G
GEL, DENTIFRICE DENTAL
COMMUNITY
End: 2022-03-28

## 2022-03-28 NOTE — PATIENT INSTRUCTIONS
- We will re-start the bupropion (Wellbutrin). Take 1 tablet daily. Send an update via [employer] message in 3-4 weeks - if you are doing fine will send in long-term refill.  - Get fasting blood work done at [facility] (water and medications only for 8 hours)  - Follow up with Dr. Oscar Bryson for updated pap smear  [address] [facility]  [place], [address] [address]  [phone]  - Schedule mammogram for December 29th or later.  - Continue with physical therapy exercise for your back. Get lower back (lumbar spine) x-ray done if your back pain is not better within 1-2 months. - Follow up in 6-12 months for chronic issues; follow up earlier as needed. It was a pleasure seeing you in the clinic today. Thank you for choosing the [facility] office for your healthcare needs. Please call at [phone] with any questions or concerns.     Bal José MD

## 2022-03-29 PROBLEM — G89.29 CHRONIC BILATERAL LOW BACK PAIN WITHOUT SCIATICA: Status: ACTIVE | Noted: 2022-03-29

## 2022-03-29 PROBLEM — M54.50 CHRONIC BILATERAL LOW BACK PAIN WITHOUT SCIATICA: Status: ACTIVE | Noted: 2022-03-29

## 2022-04-08 ENCOUNTER — PATIENT MESSAGE (OUTPATIENT)
Dept: INTERNAL MEDICINE CLINIC | Facility: CLINIC | Age: 57
End: 2022-04-08

## 2022-04-08 LAB
ABSOLUTE BASOPHILS: 42 CELLS/UL (ref 0–200)
ABSOLUTE EOSINOPHILS: 122 CELLS/UL (ref 15–500)
ABSOLUTE LYMPHOCYTES: 1554 CELLS/UL (ref 850–3900)
ABSOLUTE MONOCYTES: 281 CELLS/UL (ref 200–950)
ABSOLUTE NEUTROPHILS: 2201 CELLS/UL (ref 1500–7800)
ALBUMIN/GLOBULIN RATIO: 1.7 (CALC) (ref 1–2.5)
ALBUMIN: 4.3 G/DL (ref 3.6–5.1)
ALKALINE PHOSPHATASE: 72 U/L (ref 37–153)
ALT: 17 U/L (ref 6–29)
AST: 18 U/L (ref 10–35)
BASOPHILS: 1 %
BILIRUBIN, TOTAL: 1.4 MG/DL (ref 0.2–1.2)
BUN: 16 MG/DL (ref 7–25)
CALCIUM: 9 MG/DL (ref 8.6–10.4)
CARBON DIOXIDE: 29 MMOL/L (ref 20–32)
CHLORIDE: 106 MMOL/L (ref 98–110)
CHOL/HDLC RATIO: 1.8 (CALC)
CHOLESTEROL, TOTAL: 162 MG/DL
CREATININE: 1 MG/DL (ref 0.5–1.05)
EGFR IF AFRICN AM: 73 ML/MIN/1.73M2
EGFR IF NONAFRICN AM: 63 ML/MIN/1.73M2
EOSINOPHILS: 2.9 %
GLOBULIN: 2.5 G/DL (CALC) (ref 1.9–3.7)
GLUCOSE: 84 MG/DL (ref 65–99)
HDL CHOLESTEROL: 90 MG/DL
HEMATOCRIT: 38.3 % (ref 35–45)
HEMOGLOBIN A1C: 5.2 % OF TOTAL HGB
HEMOGLOBIN: 12.9 G/DL (ref 11.7–15.5)
LDL-CHOLESTEROL: 61 MG/DL (CALC)
LYMPHOCYTES: 37 %
MCH: 29.9 PG (ref 27–33)
MCHC: 33.7 G/DL (ref 32–36)
MCV: 88.9 FL (ref 80–100)
MONOCYTES: 6.7 %
MPV: 10.7 FL (ref 7.5–12.5)
NEUTROPHILS: 52.4 %
NON-HDL CHOLESTEROL: 72 MG/DL (CALC)
PLATELET COUNT: 245 THOUSAND/UL (ref 140–400)
POTASSIUM: 4.6 MMOL/L (ref 3.5–5.3)
PROTEIN, TOTAL: 6.8 G/DL (ref 6.1–8.1)
RDW: 12.2 % (ref 11–15)
RED BLOOD CELL COUNT: 4.31 MILLION/UL (ref 3.8–5.1)
SODIUM: 140 MMOL/L (ref 135–146)
TRIGLYCERIDES: 39 MG/DL
TSH W/REFLEX TO FT4: 0.6 MIU/L (ref 0.4–4.5)
VITAMIN D, 25-OH, TOTAL: 31 NG/ML (ref 30–100)
WHITE BLOOD CELL COUNT: 4.2 THOUSAND/UL (ref 3.8–10.8)

## 2022-04-09 PROBLEM — E80.4 GILBERT'S SYNDROME: Status: ACTIVE | Noted: 2022-04-09

## 2022-04-28 DIAGNOSIS — F39 MOOD DISORDER (HCC): ICD-10-CM

## 2022-04-28 RX ORDER — BUPROPION HYDROCHLORIDE 300 MG/1
300 TABLET ORAL DAILY
Qty: 30 TABLET | Refills: 0 | OUTPATIENT
Start: 2022-04-28

## 2022-05-21 ENCOUNTER — HOSPITAL ENCOUNTER (OUTPATIENT)
Dept: GENERAL RADIOLOGY | Age: 57
Discharge: HOME | End: 2022-05-21
Attending: INTERNAL MEDICINE
Payer: COMMERCIAL

## 2022-05-21 DIAGNOSIS — G89.29 CHRONIC BILATERAL LOW BACK PAIN WITHOUT SCIATICA: ICD-10-CM

## 2022-05-21 DIAGNOSIS — M54.50 CHRONIC BILATERAL LOW BACK PAIN WITHOUT SCIATICA: ICD-10-CM

## 2022-05-21 PROCEDURE — 72110 X-RAY EXAM L-2 SPINE 4/>VWS: CPT | Performed by: INTERNAL MEDICINE

## 2022-08-04 DIAGNOSIS — F51.01 PRIMARY INSOMNIA: ICD-10-CM

## 2022-08-05 DIAGNOSIS — F39 MOOD DISORDER (HCC): ICD-10-CM

## 2022-08-05 RX ORDER — BUPROPION HYDROCHLORIDE 300 MG/1
300 TABLET ORAL DAILY
Qty: 90 TABLET | Refills: 1 | OUTPATIENT
Start: 2022-08-05

## 2022-08-05 RX ORDER — TEMAZEPAM 30 MG/1
30 CAPSULE ORAL NIGHTLY PRN
Qty: 90 CAPSULE | Refills: 0 | Status: SHIPPED | OUTPATIENT
Start: 2022-08-05

## 2022-08-05 NOTE — TELEPHONE ENCOUNTER
No Protocol     Requesting: temazepam 30mg     LOV: 3/28/22   4. Primary insomnia  Stable on temazepam therapy; continue.   She sometimes takes less then a capsule, does not necessarily take every night  RTC: 1 yr   Filled: 2/10/22 #90 0 refills   Recent Labs: 4/7/22     Upcoming OV: none scheduled

## 2022-11-18 DIAGNOSIS — F39 MOOD DISORDER (HCC): ICD-10-CM

## 2022-11-18 RX ORDER — BUPROPION HYDROCHLORIDE 300 MG/1
300 TABLET ORAL DAILY
Qty: 90 TABLET | Refills: 1 | Status: SHIPPED | OUTPATIENT
Start: 2022-11-18

## 2022-12-29 ENCOUNTER — APPOINTMENT (OUTPATIENT)
Dept: URBAN - METROPOLITAN AREA CLINIC 247 | Age: 57
Setting detail: DERMATOLOGY
End: 2022-12-31

## 2022-12-29 DIAGNOSIS — L57.8 OTHER SKIN CHANGES DUE TO CHRONIC EXPOSURE TO NONIONIZING RADIATION: ICD-10-CM

## 2022-12-29 DIAGNOSIS — L21.8 OTHER SEBORRHEIC DERMATITIS: ICD-10-CM

## 2022-12-29 DIAGNOSIS — L82.1 OTHER SEBORRHEIC KERATOSIS: ICD-10-CM

## 2022-12-29 DIAGNOSIS — L57.0 ACTINIC KERATOSIS: ICD-10-CM

## 2022-12-29 DIAGNOSIS — D18.0 HEMANGIOMA: ICD-10-CM

## 2022-12-29 DIAGNOSIS — Q83.3 ACCESSORY NIPPLE: ICD-10-CM

## 2022-12-29 PROBLEM — D18.01 HEMANGIOMA OF SKIN AND SUBCUTANEOUS TISSUE: Status: ACTIVE | Noted: 2022-12-29

## 2022-12-29 PROCEDURE — OTHER ADDITIONAL NOTES: OTHER

## 2022-12-29 PROCEDURE — OTHER DEFER: OTHER

## 2022-12-29 PROCEDURE — 17000 DESTRUCT PREMALG LESION: CPT

## 2022-12-29 PROCEDURE — 99204 OFFICE O/P NEW MOD 45 MIN: CPT | Mod: 25

## 2022-12-29 PROCEDURE — OTHER PRESCRIPTION: OTHER

## 2022-12-29 PROCEDURE — OTHER COUNSELING: OTHER

## 2022-12-29 PROCEDURE — OTHER MIPS QUALITY: OTHER

## 2022-12-29 PROCEDURE — OTHER MEDICATION COUNSELING: OTHER

## 2022-12-29 PROCEDURE — OTHER LIQUID NITROGEN: OTHER

## 2022-12-29 PROCEDURE — OTHER PRESCRIPTION MEDICATION MANAGEMENT: OTHER

## 2022-12-29 ASSESSMENT — LOCATION DETAILED DESCRIPTION DERM
LOCATION DETAILED: LEFT RIB CAGE
LOCATION DETAILED: INFERIOR THORACIC SPINE
LOCATION DETAILED: EPIGASTRIC SKIN
LOCATION DETAILED: RIGHT FOREHEAD
LOCATION DETAILED: RIGHT RIB CAGE
LOCATION DETAILED: LEFT SUPERIOR MEDIAL BUCCAL CHEEK
LOCATION DETAILED: RIGHT SUPERIOR PARIETAL SCALP

## 2022-12-29 ASSESSMENT — LOCATION ZONE DERM
LOCATION ZONE: FACE
LOCATION ZONE: SCALP
LOCATION ZONE: TRUNK

## 2022-12-29 ASSESSMENT — LOCATION SIMPLE DESCRIPTION DERM
LOCATION SIMPLE: SCALP
LOCATION SIMPLE: UPPER BACK
LOCATION SIMPLE: ABDOMEN
LOCATION SIMPLE: RIGHT FOREHEAD
LOCATION SIMPLE: LEFT CHEEK

## 2022-12-29 NOTE — PROCEDURE: LIQUID NITROGEN
Consent: The patient's consent was obtained including but not limited to risks of crusting, scabbing, blistering, scarring, darker or lighter pigmentary change, recurrence, incomplete removal and infection.
Detail Level: Detailed
Render Note In Bullet Format When Appropriate: Yes
Application Tool (Optional): Cry-AC
Post-Care Instructions: I reviewed with the patient in detail post-care instructions. Patient is to wear sunprotection, and avoid picking at any of the treated lesions. Pt may apply Vaseline to crusted or scabbing areas.
Duration Of Freeze Thaw-Cycle (Seconds): 5
Number Of Freeze-Thaw Cycles: 1 freeze-thaw cycle

## 2022-12-29 NOTE — PROCEDURE: DEFER
Detail Level: Simple
Introduction Text (Please End With A Colon): .
X Size Of Lesion In Cm (Optional): 0

## 2022-12-29 NOTE — PROCEDURE: MEDICATION COUNSELING
Sherrill Braun called requesting a refill of the below medication which has been pended for you:     Requested Prescriptions     Pending Prescriptions Disp Refills    valACYclovir (VALTREX) 1 g tablet 4 tablet 3     Si TABS BID FOR 1 DAY       Last Appointment Date: 4/15/2021  Next Appointment Date: Visit date not found    No Known Allergies Drysol Counseling:  I discussed with the patient the risks of drysol/aluminum chloride including but not limited to skin rash, itching, irritation, burning.

## 2022-12-29 NOTE — PROCEDURE: ADDITIONAL NOTES
Render Risk Assessment In Note?: no
Detail Level: Zone
Additional Notes: pt reports getting filler from dentist and it is lumpy upper lip, would like this dissolved and address lines around mouth with dr alcaraz

## 2022-12-29 NOTE — PROCEDURE: MEDICATION COUNSELING
Have him increase the Seroquel to 50 mg in the evening.   Xolair Counseling:  Patient informed of potential adverse effects including but not limited to fever, muscle aches, rash and allergic reactions.  The patient verbalized understanding of the proper use and possible adverse effects of Xolair.  All of the patient's questions and concerns were addressed.

## 2022-12-29 NOTE — PROCEDURE: PRESCRIPTION MEDICATION MANAGEMENT
Initiate Treatment: fluocinonide 0.05 % topical solution \\nApply to scalp QHS
Render In Strict Bullet Format?: No
Detail Level: Zone

## 2022-12-30 RX ORDER — FLUOCINONIDE 0.5 MG/ML
SOLUTION TOPICAL
Qty: 60 | Refills: 6 | Status: ERX | COMMUNITY
Start: 2022-12-29

## 2023-01-16 DIAGNOSIS — F51.01 PRIMARY INSOMNIA: ICD-10-CM

## 2023-01-16 RX ORDER — TEMAZEPAM 30 MG/1
30 CAPSULE ORAL NIGHTLY PRN
Qty: 90 CAPSULE | Refills: 0 | Status: SHIPPED | OUTPATIENT
Start: 2023-01-16

## 2023-02-07 ENCOUNTER — HOSPITAL ENCOUNTER (OUTPATIENT)
Dept: MAMMOGRAPHY | Age: 58
Discharge: HOME | End: 2023-02-07
Attending: INTERNAL MEDICINE
Payer: COMMERCIAL

## 2023-02-07 ENCOUNTER — HOSPITAL ENCOUNTER (OUTPATIENT)
Dept: GENERAL RADIOLOGY | Age: 58
Discharge: HOME | End: 2023-02-07
Attending: INTERNAL MEDICINE
Payer: COMMERCIAL

## 2023-02-07 DIAGNOSIS — M06.9 RHEUMATOID ARTHRITIS, INVOLVING UNSPECIFIED SITE, UNSPECIFIED WHETHER RHEUMATOID FACTOR PRESENT (HCC): ICD-10-CM

## 2023-02-07 DIAGNOSIS — Z12.31 ENCOUNTER FOR SCREENING MAMMOGRAM FOR MALIGNANT NEOPLASM OF BREAST: ICD-10-CM

## 2023-02-07 PROCEDURE — 73630 X-RAY EXAM OF FOOT: CPT | Performed by: INTERNAL MEDICINE

## 2023-02-07 PROCEDURE — 77067 SCR MAMMO BI INCL CAD: CPT | Performed by: INTERNAL MEDICINE

## 2023-02-07 PROCEDURE — 77063 BREAST TOMOSYNTHESIS BI: CPT | Performed by: INTERNAL MEDICINE

## 2023-03-21 DIAGNOSIS — F39 MOOD DISORDER (HCC): ICD-10-CM

## 2023-03-21 RX ORDER — SERTRALINE HYDROCHLORIDE 100 MG/1
100 TABLET, FILM COATED ORAL DAILY
Qty: 90 TABLET | Refills: 0 | Status: SHIPPED | OUTPATIENT
Start: 2023-03-21

## 2023-04-04 ENCOUNTER — OFFICE VISIT (OUTPATIENT)
Dept: INTERNAL MEDICINE CLINIC | Facility: CLINIC | Age: 58
End: 2023-04-04
Payer: COMMERCIAL

## 2023-04-04 VITALS
HEIGHT: 64.75 IN | WEIGHT: 158 LBS | DIASTOLIC BLOOD PRESSURE: 74 MMHG | OXYGEN SATURATION: 100 % | RESPIRATION RATE: 14 BRPM | SYSTOLIC BLOOD PRESSURE: 128 MMHG | HEART RATE: 62 BPM | TEMPERATURE: 98 F | BODY MASS INDEX: 26.65 KG/M2

## 2023-04-04 DIAGNOSIS — Z00.00 ENCOUNTER FOR PREVENTATIVE ADULT HEALTH CARE EXAMINATION: Primary | ICD-10-CM

## 2023-04-04 DIAGNOSIS — F51.01 PRIMARY INSOMNIA: Chronic | ICD-10-CM

## 2023-04-04 DIAGNOSIS — M05.7A RHEUMATOID ARTHRITIS OF OTHER SITE WITH POSITIVE RHEUMATOID FACTOR (HCC): Chronic | ICD-10-CM

## 2023-04-04 DIAGNOSIS — F39 MOOD DISORDER (HCC): Chronic | ICD-10-CM

## 2023-04-04 PROBLEM — N89.8 VAGINAL DRYNESS: Status: ACTIVE | Noted: 2023-01-18

## 2023-04-04 PROBLEM — N89.8 VAGINAL DRYNESS: Status: RESOLVED | Noted: 2023-01-18 | Resolved: 2023-04-04

## 2023-04-04 PROCEDURE — 3074F SYST BP LT 130 MM HG: CPT | Performed by: INTERNAL MEDICINE

## 2023-04-04 PROCEDURE — 99396 PREV VISIT EST AGE 40-64: CPT | Performed by: INTERNAL MEDICINE

## 2023-04-04 PROCEDURE — 3008F BODY MASS INDEX DOCD: CPT | Performed by: INTERNAL MEDICINE

## 2023-04-04 PROCEDURE — 3078F DIAST BP <80 MM HG: CPT | Performed by: INTERNAL MEDICINE

## 2023-04-04 RX ORDER — UPADACITINIB 30 MG/1
TABLET, EXTENDED RELEASE ORAL
COMMUNITY
Start: 2023-04-03

## 2023-04-04 RX ORDER — ESTRADIOL 10 UG/1
INSERT VAGINAL
COMMUNITY
End: 2023-04-04

## 2023-04-04 RX ORDER — FLUOCINONIDE TOPICAL SOLUTION USP, 0.05% 0.5 MG/ML
SOLUTION TOPICAL
COMMUNITY
End: 2023-04-04 | Stop reason: ALTCHOICE

## 2023-04-04 NOTE — PATIENT INSTRUCTIONS
- Send a [employer] message with your lab results from Dr. Blanca Pichardo  - Will let you know if you need to do further blood tests after that  - Ask Dr. Blanca Pichardo if he thinks you are ok for the shingles (Shingrix) shots. It is two shots. If he says you can get the Shingrix shots you can call our office to schedule nurse visits for this or can get them at your local [facility].  - Next colonoscopy due in 2026  - Will continue sertraline at current dose for the time being  - Follow up in 1 year for physical/chronic issues; follow up earlier as needed. It was a pleasure seeing you in the clinic today. Thank you for choosing the [facility] office for your healthcare needs. Please call at [phone] with any questions or concerns.     Jignesh Smith MD

## 2023-04-05 ENCOUNTER — PATIENT MESSAGE (OUTPATIENT)
Dept: INTERNAL MEDICINE CLINIC | Facility: CLINIC | Age: 58
End: 2023-04-05

## 2023-04-05 DIAGNOSIS — Z00.00 PREVENTATIVE HEALTH CARE: Primary | ICD-10-CM

## 2023-04-05 NOTE — TELEPHONE ENCOUNTER
From: Montse Thornton  To: Meliton Tellez MD  Sent: 4/5/2023 1:46 PM CDT  Subject: Labs    Labs.  Rohitry had to send this way but would only allow me to upload 3 at a time

## 2023-04-13 ENCOUNTER — PATIENT MESSAGE (OUTPATIENT)
Dept: INTERNAL MEDICINE CLINIC | Facility: CLINIC | Age: 58
End: 2023-04-13

## 2023-04-13 DIAGNOSIS — R30.0 DYSURIA: Primary | ICD-10-CM

## 2023-04-13 NOTE — TELEPHONE ENCOUNTER
We can just check a urine culture - if she can get it done sometime today before she leaves for [place] tomorrow that would be good. If it is positive I could always send an antibiotic electronically to a pharmacy near her in [place]. In the meantime aside from Azo would recommend strong hydration, also cranberry juice.

## 2023-05-01 ENCOUNTER — PATIENT MESSAGE (OUTPATIENT)
Dept: INTERNAL MEDICINE CLINIC | Facility: CLINIC | Age: 58
End: 2023-05-01

## 2023-05-01 DIAGNOSIS — R30.0 DYSURIA: Primary | ICD-10-CM

## 2023-05-01 NOTE — TELEPHONE ENCOUNTER
From: Mukesh Renee  To: Karina Sanchez MD  Sent: 5/1/2023 8:37 AM CDT  Subject: Another possible UTI    Good morning,   I felt slight pressure when going to bathroom & my lower back sore . I did this home test this morning . Does this mean possible infection still ? Or again ?   Arzella Sprain

## 2023-05-01 NOTE — TELEPHONE ENCOUNTER
[facility] sent to pt. Please disregard letter encounter for today. Erroneous encounter created.

## 2023-05-01 NOTE — TELEPHONE ENCOUNTER
Patient c/o UTI symptoms. Uploaded a home test, unsure how to read. She agreeable to going to [facility].       Order pended. Please review and sign if appropriate. Thank you!

## 2023-05-05 RX ORDER — SULFAMETHOXAZOLE AND TRIMETHOPRIM 800; 160 MG/1; MG/1
1 TABLET ORAL 2 TIMES DAILY
Qty: 20 TABLET | Refills: 0 | Status: SHIPPED | OUTPATIENT
Start: 2023-05-05 | End: 2023-05-15

## 2023-06-19 DIAGNOSIS — F51.01 PRIMARY INSOMNIA: ICD-10-CM

## 2023-06-19 DIAGNOSIS — F39 MOOD DISORDER (HCC): ICD-10-CM

## 2023-06-19 RX ORDER — TEMAZEPAM 30 MG/1
30 CAPSULE ORAL NIGHTLY PRN
Qty: 90 CAPSULE | Refills: 1 | Status: SHIPPED | OUTPATIENT
Start: 2023-06-19

## 2023-06-19 RX ORDER — SERTRALINE HYDROCHLORIDE 100 MG/1
100 TABLET, FILM COATED ORAL DAILY
Qty: 90 TABLET | Refills: 1 | Status: SHIPPED | OUTPATIENT
Start: 2023-06-19

## 2023-07-13 ENCOUNTER — OFFICE VISIT (OUTPATIENT)
Dept: SURGERY | Facility: CLINIC | Age: 58
End: 2023-07-13

## 2023-07-13 DIAGNOSIS — R31.29 MICROSCOPIC HEMATURIA: ICD-10-CM

## 2023-07-13 DIAGNOSIS — N95.2 VAGINAL ATROPHY: ICD-10-CM

## 2023-07-13 DIAGNOSIS — N39.0 RECURRENT UTI: Primary | ICD-10-CM

## 2023-07-13 DIAGNOSIS — R82.90 URINE FINDING: ICD-10-CM

## 2023-07-13 DIAGNOSIS — N89.8 VAGINAL ODOR: ICD-10-CM

## 2023-07-13 LAB
APPEARANCE: CLEAR
BILIRUBIN: NEGATIVE
GLUCOSE (URINE DIPSTICK): NEGATIVE MG/DL
KETONES (URINE DIPSTICK): NEGATIVE MG/DL
LEUKOCYTES: NEGATIVE
MULTISTIX LOT#: ABNORMAL NUMERIC
NITRITE, URINE: NEGATIVE
PH, URINE: 5.5 (ref 4.5–8)
PROTEIN (URINE DIPSTICK): NEGATIVE MG/DL
SPECIFIC GRAVITY: 1.02 (ref 1–1.03)
URINE-COLOR: YELLOW
UROBILINOGEN,SEMI-QN: 0.2 MG/DL (ref 0–1.9)

## 2023-07-13 PROCEDURE — 51798 US URINE CAPACITY MEASURE: CPT

## 2023-07-13 PROCEDURE — 99205 OFFICE O/P NEW HI 60 MIN: CPT

## 2023-07-13 PROCEDURE — 81003 URINALYSIS AUTO W/O SCOPE: CPT

## 2023-07-13 RX ORDER — ESTRADIOL 0.1 MG/G
CREAM VAGINAL
Qty: 42.5 G | Refills: 5 | Status: SHIPPED | OUTPATIENT
Start: 2023-07-13

## 2023-07-13 RX ORDER — ESTRADIOL 0.1 MG/G
CREAM VAGINAL
Qty: 42.5 G | Refills: 5 | Status: SHIPPED | OUTPATIENT
Start: 2023-07-13 | End: 2023-07-13

## 2023-07-13 NOTE — PATIENT INSTRUCTIONS
- cranberry/extract pills/supplements, D-mannose drinking at least 40-60 oz water per day or enough to keep urine clear and to avoid dietary irritants such as coffee, tea, carbonated drinks, soda, juice, spicy, and citrus  - discussed behavioral modifications such as double voiding, bending over after void to completely empty, and post coital hygiene  - 1g d-mannose

## 2023-07-13 NOTE — PROGRESS NOTES
[facility], [address], [address] [facility]    Urology Consult Note    History of Present Illness:   Patient is a(n) 63year old female with RA and DCIS breast cx with reconstruction in  presents for recurrent UTI sx. Pt states she has noticed increased UTI in the last few yrs. Sometimes will occur once a yr but other have had recurrent ones within a month at times. Occasionally correlated post coital. Sx include pelvic pressure, frequency, urgency, and occasionally dysuria. Azo does not relieve sx. She also noticed vaginal odor at times but not consistently with UTI occurrences. GYN did not do work up and believes it is 2/2 menopause. Recently, had +e.coli culture on 23 tx with macrobid 500mg bid x5days and sx resolved. However, sx shortly came back and +e.coli culture again on 23 tx with bactrim ds bid x10 days and sx resolved again. Currently asx and will occasionally have discomfort with intercourse but thinks more related to vaginal atrophy. Was prescribed estrace suppository by GYN a couple yrs ago but did not use. Uses lubrication instead. Drinks 64oz water, 1 cup coffee with light clear urine. PVR: 0ml today. UA trace blood.     HISTORY:  Past Medical History:   Diagnosis Date    Breast cancer ([address] 2014    Closed displaced fracture of phalanx of right little finger with routine healing, unspecified phalanx, subsequent encounter 2021    COVID-19 2021    Ductal carcinoma in situ of breast 2014    Endometriosis 2015    Laceration of right thumb without foreign body without damage to nail, initial encounter 2021    SVT (supraventricular tachycardia) ([address]     Vaginal dryness 2023      Past Surgical History:   Procedure Laterality Date           and     COLONOSCOPY,DIAGNOSTIC N/A 2016    Procedure: [place] POLYPECTOMY;  Surgeon: Ladonna Cabrera MD;  Location: [facility]    ENDOVAS NON-CARDIAC ABL CATH      LUMPECTOMY RIGHT Right 2014    DCIS    MELBA BIOPSY STEREO NODULE 1 SITE RIGHT (CPT=19081) Right 2014    OTHER SURGICAL HISTORY  2011    Cardiac ablation    OTHER SURGICAL HISTORY  2005    Cervical ablation    OTHER SURGICAL HISTORY Right 2017    Breast reconstruction     RADIATION RIGHT Right 2014    right lump with rad    STEREOTACTIC BREAST BIOPSY  3-4-14 [place] [facility]    Right breast -- 12 o'clock position      Family History   Problem Relation Age of Onset    Diabetes Father     Cancer Father         prostate cancer    Hypertension Father     Breast Cancer Maternal Aunt 67    Hypertension Mother     Breast Cancer Sister 54    Other (Healthy) Sister     Other (Healthy) Brother     DCIS Self 51    Breast Cancer Self 51        DCIS    Breast Cancer Maternal Cousin Female 48      Social History:   Social History     Socioeconomic History    Marital status:    Occupational History    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 Exercise No        Allergies  No Known Allergies    Review of Systems:   A 10-point review of systems was completed and is negative other than as noted above. Physical Exam:   LMP 07/05/2019     GENERAL APPEARANCE: well developed, well nourished, in no acute distress  NEUROLOGIC: no localizing neurologic signs, alert and oriented x 3, converses appropriately  HEAD: atraumatic, normocephalic  EYES: sclera non-icteric  ORAL CAVITY: mucosa moist  NECK/THYROID: no obvious masses or goiter  LUNGS: non-labored breathing  ABDOMEN: soft, nontender, nondistended  EXTREMITIES: warm, well-perfused. No clubbing, cyanosis or edema.   SKIN: no obvious rashes    Results: Laboratory Data:  Lab Results   Component Value Date    WBC 4.2 04/07/2022    HGB 12.9 04/07/2022     04/07/2022     Lab Results   Component Value Date     04/07/2022    K 4.6 04/07/2022     04/07/2022    CO2 29 04/07/2022    BUN 16 04/07/2022    GLU 84 04/07/2022    GFRAA 73 04/07/2022    AST 18 04/07/2022    ALT 17 04/07/2022    TP 6.8 04/07/2022    ALB 4.3 04/07/2022    CA 9.0 04/07/2022       Urinalysis Results (last three years):  Recent Labs     07/14/20  1612 07/13/23  0818   SPECGRAVITY 1.030 1.025   PHURINE 6.5 5.5   NITRITE n Negative       Urine Culture Results (last three years):  Lab Results   Component Value Date    URINECUL >100,000 CFU/ML Escherichia coli (A) 09/30/2020       Imaging  No results found. Impression:   Recommendations:  Recurrent UTI  - likely 2/2 menopause  - estrace to be applied to urethra daily for 2 weeks, then 3x/week  - standing culture placed; pt to do culture and call office if she has sx   - discussed dietery modifications such as cranberry/extract pills/supplements, D-mannose 1g daily, drinking at least 40-60 oz water per day or enough to keep urine clear and to avoid dietary irritants such as coffee, tea, carbonated drinks, soda, juice, spicy, and citrus  - discussed behavioral modifications such as double voiding, bending over after void to completely empty, and post coital hygiene    Microscopic hematuria  - microscopic analysis today   - discussed potential for CT A/P and cysto if +RBC    Vaginal odor and atrophy  - pt to f/u with obgyn regarding this if persisting    Thank you very much for this consult. Please call if there are any questions or concerns.      Norma Borja PA-C  Urology  [facility] [address]  Phone: [phone]    Date: 7/13/2023  Time: 10:34 AM

## 2023-09-20 ENCOUNTER — OFFICE VISIT (OUTPATIENT)
Dept: FAMILY MEDICINE CLINIC | Facility: CLINIC | Age: 58
End: 2023-09-20
Payer: COMMERCIAL

## 2023-09-20 VITALS
BODY MASS INDEX: 25.83 KG/M2 | OXYGEN SATURATION: 100 % | RESPIRATION RATE: 18 BRPM | SYSTOLIC BLOOD PRESSURE: 132 MMHG | HEIGHT: 65 IN | WEIGHT: 155 LBS | HEART RATE: 68 BPM | TEMPERATURE: 98 F | DIASTOLIC BLOOD PRESSURE: 84 MMHG

## 2023-09-20 DIAGNOSIS — J01.10 ACUTE NON-RECURRENT FRONTAL SINUSITIS: Primary | ICD-10-CM

## 2023-09-20 PROCEDURE — 3079F DIAST BP 80-89 MM HG: CPT | Performed by: NURSE PRACTITIONER

## 2023-09-20 PROCEDURE — 99213 OFFICE O/P EST LOW 20 MIN: CPT | Performed by: NURSE PRACTITIONER

## 2023-09-20 PROCEDURE — 3075F SYST BP GE 130 - 139MM HG: CPT | Performed by: NURSE PRACTITIONER

## 2023-09-20 PROCEDURE — 3008F BODY MASS INDEX DOCD: CPT | Performed by: NURSE PRACTITIONER

## 2023-09-20 RX ORDER — DOXYCYCLINE HYCLATE 100 MG/1
100 CAPSULE ORAL 2 TIMES DAILY
Qty: 14 CAPSULE | Refills: 0 | Status: SHIPPED | OUTPATIENT
Start: 2023-09-20 | End: 2023-09-27

## 2023-09-20 NOTE — PATIENT INSTRUCTIONS
Antibiotic as prescribed. Take with food. You can take your flonase or nasacort nasal spray for the next several days if needed  Follow up with your doctor if not improving over the next 3-4 days. Follow up sooner for any new or worsening symptoms.

## 2023-10-31 ENCOUNTER — TELEPHONE (OUTPATIENT)
Dept: SURGERY | Facility: CLINIC | Age: 58
End: 2023-10-31

## 2023-10-31 ENCOUNTER — PATIENT MESSAGE (OUTPATIENT)
Dept: SURGERY | Facility: CLINIC | Age: 58
End: 2023-10-31

## 2023-10-31 DIAGNOSIS — R82.90 URINE FINDING: Primary | ICD-10-CM

## 2023-10-31 RX ORDER — SULFAMETHOXAZOLE AND TRIMETHOPRIM 800; 160 MG/1; MG/1
1 TABLET ORAL 2 TIMES DAILY
Qty: 14 TABLET | Refills: 0 | Status: SHIPPED | OUTPATIENT
Start: 2023-10-31 | End: 2023-11-07

## 2023-10-31 RX ORDER — ESTRADIOL 0.1 MG/G
CREAM VAGINAL
Qty: 42.5 G | Refills: 5 | Status: SHIPPED | OUTPATIENT
Start: 2023-10-31

## 2023-10-31 NOTE — TELEPHONE ENCOUNTER
Pt message 10-31-23. Pt called stating pt has an symptoms of an uti. Should pt have a urine test.  Will you rx. Send to [facility].   Please call pt

## 2023-10-31 NOTE — TELEPHONE ENCOUNTER
Spoke w pt on phone. She should repeat urine culture before picking up abx. Sx pretty bad and would like to start something now. Sending bactrim ds bid x 7 days. Will get back in touch if she needs to switch abx. Re-sent estrace cream as she never began this. Continue pushing fluids, d-mannose, and cranberry.

## 2023-10-31 NOTE — TELEPHONE ENCOUNTER
This encounter is now closed. RN replied via [address] [place] [address] [address]. Order of UA/Cx placed. (V5) oriented

## 2023-11-01 ENCOUNTER — LAB ENCOUNTER (OUTPATIENT)
Dept: LAB | Age: 58
End: 2023-11-01
Payer: COMMERCIAL

## 2023-11-01 DIAGNOSIS — R82.90 URINE FINDING: Primary | ICD-10-CM

## 2023-11-01 DIAGNOSIS — Z00.00 PREVENTATIVE HEALTH CARE: ICD-10-CM

## 2023-11-01 LAB
BILIRUB UR QL STRIP.AUTO: NEGATIVE
CHOLEST SERPL-MCNC: 199 MG/DL (ref ?–200)
CLARITY UR REFRACT.AUTO: CLEAR
FASTING PATIENT LIPID ANSWER: YES
GLUCOSE UR STRIP.AUTO-MCNC: NORMAL MG/DL
HDLC SERPL-MCNC: 102 MG/DL (ref 40–59)
KETONES UR STRIP.AUTO-MCNC: NEGATIVE MG/DL
LDLC SERPL CALC-MCNC: 87 MG/DL (ref ?–100)
LEUKOCYTE ESTERASE UR QL STRIP.AUTO: 25
NITRITE UR QL STRIP.AUTO: NEGATIVE
NONHDLC SERPL-MCNC: 97 MG/DL (ref ?–130)
PH UR STRIP.AUTO: 6 [PH] (ref 5–8)
PROT UR STRIP.AUTO-MCNC: NEGATIVE MG/DL
SP GR UR STRIP.AUTO: 1.02 (ref 1–1.03)
TRIGL SERPL-MCNC: 54 MG/DL (ref 30–149)
UROBILINOGEN UR STRIP.AUTO-MCNC: NORMAL MG/DL
VLDLC SERPL CALC-MCNC: 9 MG/DL (ref 0–30)

## 2023-11-01 PROCEDURE — 81001 URINALYSIS AUTO W/SCOPE: CPT

## 2023-11-01 PROCEDURE — 80061 LIPID PANEL: CPT

## 2023-11-01 PROCEDURE — 87086 URINE CULTURE/COLONY COUNT: CPT

## 2023-11-01 PROCEDURE — 36415 COLL VENOUS BLD VENIPUNCTURE: CPT

## 2023-12-18 DIAGNOSIS — F39 MOOD DISORDER (HCC): ICD-10-CM

## 2023-12-18 RX ORDER — SERTRALINE HYDROCHLORIDE 100 MG/1
100 TABLET, FILM COATED ORAL DAILY
Qty: 90 TABLET | Refills: 1 | OUTPATIENT
Start: 2023-12-18

## 2023-12-18 RX ORDER — SERTRALINE HYDROCHLORIDE 100 MG/1
100 TABLET, FILM COATED ORAL DAILY
Qty: 90 TABLET | Refills: 1 | Status: SHIPPED | OUTPATIENT
Start: 2023-12-18

## 2023-12-18 NOTE — TELEPHONE ENCOUNTER
Requested Prescriptions     Pending Prescriptions Disp Refills    sertraline 100 MG Oral Tab 90 tablet 1     Sig: Take 1 tablet (100 mg total) by mouth daily. No protocol     LOV 4/4/23  RTC 1 year  Filled 6/19/23 90 tabs 1 refill   No future appointments.

## 2024-01-11 ENCOUNTER — PATIENT MESSAGE (OUTPATIENT)
Dept: INTERNAL MEDICINE CLINIC | Facility: CLINIC | Age: 59
End: 2024-01-11

## 2024-01-11 DIAGNOSIS — Z12.31 ENCOUNTER FOR SCREENING MAMMOGRAM FOR MALIGNANT NEOPLASM OF BREAST: Primary | ICD-10-CM

## 2024-01-12 NOTE — TELEPHONE ENCOUNTER
From: Emily Andujar  To: Margarita Keith  Sent: 1/11/2024 11:58 AM CST  Subject: Mammogram     I received a message that I’m due for Mammogram in February. I called to schedule but they said I need order for mammogram to be sent before I can schedule. Also, I was wondering if Topamax (for weight loss) is something I could take along with the Zoloft ? I know all the weight loss injections are not covered by my insurance & cost quite a bit. Do you know anything about GOLO if safe/effective?    Emily Andujar

## 2024-01-12 NOTE — TELEPHONE ENCOUNTER
Mammogram order signed.  Yes, would recommend office visit or video visit to discuss starting topiramate for weight loss.

## 2024-01-12 NOTE — TELEPHONE ENCOUNTER
See MCM from patient. Recommend office visit to discuss weight management?    Last mammogram 02/07/2023, pended new order.     LOV:   04/04/2023 Dr Keith RTC 1 year  No FOV scheduled

## 2024-02-09 ENCOUNTER — HOSPITAL ENCOUNTER (EMERGENCY)
Age: 59
Discharge: HOME | End: 2024-02-09
Attending: EMERGENCY MEDICINE
Payer: COMMERCIAL

## 2024-02-09 ENCOUNTER — APPOINTMENT (OUTPATIENT)
Dept: GENERAL RADIOLOGY | Age: 59
End: 2024-02-09
Attending: PHYSICIAN ASSISTANT
Payer: COMMERCIAL

## 2024-02-09 VITALS
SYSTOLIC BLOOD PRESSURE: 148 MMHG | OXYGEN SATURATION: 100 % | DIASTOLIC BLOOD PRESSURE: 78 MMHG | TEMPERATURE: 97 F | HEIGHT: 65 IN | BODY MASS INDEX: 25.83 KG/M2 | WEIGHT: 155 LBS | HEART RATE: 77 BPM | RESPIRATION RATE: 15 BRPM

## 2024-02-09 DIAGNOSIS — S22.32XA CLOSED FRACTURE OF ONE RIB OF LEFT SIDE, INITIAL ENCOUNTER: Primary | ICD-10-CM

## 2024-02-09 DIAGNOSIS — S42.402A CLOSED FRACTURE OF LEFT ELBOW, INITIAL ENCOUNTER: ICD-10-CM

## 2024-02-09 PROCEDURE — 99284 EMERGENCY DEPT VISIT MOD MDM: CPT

## 2024-02-09 PROCEDURE — 73080 X-RAY EXAM OF ELBOW: CPT | Performed by: PHYSICIAN ASSISTANT

## 2024-02-09 PROCEDURE — 71101 X-RAY EXAM UNILAT RIBS/CHEST: CPT | Performed by: PHYSICIAN ASSISTANT

## 2024-02-09 RX ORDER — NAPROXEN 500 MG/1
500 TABLET ORAL 2 TIMES DAILY PRN
Qty: 20 TABLET | Refills: 0 | Status: SHIPPED | OUTPATIENT
Start: 2024-02-09 | End: 2024-02-16

## 2024-02-09 RX ORDER — CEPHALEXIN 500 MG/1
500 CAPSULE ORAL 3 TIMES DAILY
Qty: 30 CAPSULE | Refills: 0 | Status: SHIPPED | OUTPATIENT
Start: 2024-02-09 | End: 2024-02-19

## 2024-02-09 RX ORDER — CEPHALEXIN 250 MG/1
250 CAPSULE ORAL ONCE
Status: COMPLETED | OUTPATIENT
Start: 2024-02-09 | End: 2024-02-09

## 2024-02-09 NOTE — DISCHARGE INSTRUCTIONS
Wear sling during the day, remove at night.  Follow-up with provide orthopedics.  Take antibiotic as written.  If red discoloration, swelling or pain worsen immediately seek reevaluation.  Perform incentive spirometry as instructed.  Naproxen twice daily for pain and inflammation

## 2024-02-09 NOTE — ED PROVIDER NOTES
Patient Seen in: [facility] In [place]      History     Chief Complaint   Patient presents with    Elbow Pain     Stated Complaint: fall/left elbow pain    Subjective:   HPI    Pleasant 58-year-old female.  Right-hand-dominant.  While on vacation, in [place], the patient fell on wet steps and struck her left elbow and left ribs.  She sustained a superficial wound to the elbow.  There was minimal medical care at the resort.  She has been applying topical antibiotic but fears that the area has developed infection.  Increased pain and swelling to the elbow region.  Continues to have bruising and pain to the left ribs.  Did not strike her head.  No nausea, vomiting.  No focal deficits.    Objective:   Past Medical History:   Diagnosis Date    Breast cancer (HCC) 2014    Closed displaced fracture of phalanx of right little finger with routine healing, unspecified phalanx, subsequent encounter 2021    COVID-19 2021    Ductal carcinoma in situ of breast 2014    Endometriosis 2015    Laceration of right thumb without foreign body without damage to nail, initial encounter 2021    SVT (supraventricular tachycardia)     Vaginal dryness 2023              Past Surgical History:   Procedure Laterality Date           and     COLONOSCOPY,DIAGNOSTIC N/A 2016    Procedure: COLONOSCOPY,POSSIBLE BIOPSY,POSSIBLE POLYPECTOMY;  Surgeon: Sanju Patten MD;  Location: [facility]    ENDOVAS NON-CARDIAC ABL CATH      LUMPECTOMY RIGHT Right 2014    DCIS    MELBA BIOPSY STEREO NODULE 1 SITE RIGHT (CPT=19081) Right     OTHER SURGICAL HISTORY      Cardiac ablation    OTHER SURGICAL HISTORY      Cervical ablation    OTHER SURGICAL HISTORY Right 2017    Breast reconstruction     RADIATION RIGHT Right     right lump with rad    STEREOTACTIC BREAST BIOPSY  3-4-14 [facility]    Right breast -- 12 o'clock position                Social History     Socioeconomic  History    Marital status:    Occupational History    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     Exercise No              Review of Systems    Positive for stated complaint: fall/left elbow pain  Other systems are as noted in HPI.  Constitutional and vital signs reviewed.      All other systems reviewed and negative except as noted above.    Physical Exam     ED Triage Vitals [02/09/24 1015]   /78   Pulse 77   Resp 15   Temp 97.3 °F (36.3 °C)   Temp src Temporal   SpO2 100 %   O2 Device None (Room air)       Current:/78   Pulse 77   Temp 97.3 °F (36.3 °C) (Temporal)   Resp 15   Ht 165.1 cm (5' 5\")   Wt 70.3 kg   LMP 07/05/2019   SpO2 100%   BMI 25.79 kg/m²         Physical Exam    Gen: Well appearing, well groomed, alert and aware x 3  Neck: Supple, full range of motion  Eye examination: EOMs are intact, normal conjunctival  ENT: Atraumatic.  No Deleon sign, raccoon sign or hemotympanum  Lung: No distress, RR, no retraction, breath sounds are clear throughout  Extremities: Superficial wound to the left elbow with surrounding erythema and indurated changes.  Generalized swelling to the left elbow.  Patient maintains full flexion and extension, pronation and supination.  Chest wall: Bruising to the left lateral posterior ribs, roughly ribs 8.  Pain to palpation without crepitus.  Back: Full range of motion    ED Course   Labs Reviewed - No data to display     XR ELBOW, COMPLETE (MIN 3 VIEWS), LEFT (CPT=73080)    Result Date: 2/9/2024  PROCEDURE:  XR ELBOW, COMPLETE (MIN 3 VIEWS), LEFT (CPT=73080)  TECHNIQUE:  Three views were obtained.  COMPARISON:  None.  INDICATIONS:   fall/left elbow pain  PATIENT STATED HISTORY: (As transcribed by Technologist)  Patient fell on 2/4/24 on some wet steps and landed on her left elbow. She has an abrasion and pain to the posterior aspect of the elbow, on the tip of the olecranon process.     FINDINGS:    Tiny chip or avulsion fracture noted involving the olecranon process.  There is focal soft tissue swelling over the olecranon which could reflect hematoma or bursal fluid.  There is no other evidence for other fracture, subluxation or dislocation.  No joint effusion seen.            CONCLUSION:  Tiny chip or avulsion fracture involving the olecranon with adjacent soft tissue swelling.   LOCATION:  [facility]    Dictated by (CST): Duane Waters MD on 2/09/2024 at 11:23 AM     Finalized by (CST): Duane Waters MD on 2/09/2024 at 11:24 AM       XR RIBS WITH CHEST (3 VIEWS), LEFT  (CPT=71101)    Result Date: 2/9/2024  PROCEDURE:  XR RIBS WITH CHEST (3 VIEWS), LEFT  (CPT=71101)  TECHNIQUE:  PA Chest and three views of the ribs were obtained  COMPARISON:  None.  INDICATIONS:  fall/left elbow pain  PATIENT STATED HISTORY: (As transcribed by Technologist)  Patient fell on 2/4/24 on some wet steps and landed on her left elbow.  She also complains of pain to the left, lower, posterior ribs.  She has intermittent shortness of breath.    FINDINGS:  LUNGS:  No significant pulmonary parenchymal abnormalities and normal vascularity. CARDIAC:  Normal size cardiac silhouette. MEDIASTINUM:  No mediastinal widening. PLEURA:  No pleural effusion or pneumothorax. BONES:  There is a posterolateral left 9th rib fracture noted. SOFT TISSUES:  Negative.            CONCLUSION:  Posterolateral left 9th rib fracture noted without significant displacement or angulation.  No pleural effusion or pneumothorax.   LOCATION:  [facility]     Dictated by ([facility]): Duane Waters MD on 2/09/2024 at 11:18 AM     Finalized by (CST): Duane Waters MD on 2/09/2024 at 11:23 AM                     MDM        Patient's tetanus is up-to-date.  She received a dosage of Keflex.  Plain films of the left elbow.  Plain films of the left ribs with chest.    CONCLUSION:  Tiny chip or avulsion fracture involving the olecranon with adjacent soft tissue swelling.   LOCATION:  [facility]    Dictated by (CST): Duane Waters MD on 2/09/2024 at 11:23 AM     Finalized by (CST): Duane Waters MD on 2/09/2024 at 11:24 AM       CONCLUSION:  Posterolateral left 9th rib fracture noted without significant displacement or angulation.  No pleural effusion or pneumothorax.   LOCATION:  [facility]     Dictated by (CST): Duane Waters MD on 2/09/2024 at 11:18 AM     Finalized by (CST): Duane Waters MD on 2/09/2024 at 11:23 AM        Patient placed in a sling.  She was taught incentive spirometry by respiratory technician        Wear sling during the day, remove at night.  Follow-up with provide orthopedics.  Take antibiotic as written.  If red discoloration, swelling or pain worsen immediately seek reevaluation.  Perform incentive spirometry as instructed.  Naproxen twice daily for pain and inflammation       My supervising physician was involved in the management of this patient.              Medical Decision Making      Disposition and Plan     Clinical Impression:  1. Closed fracture of one rib of left side, initial encounter    2. Closed fracture of left elbow, initial encounter         Disposition:  Discharge  2/9/2024 11:30 am    Follow-up:  Cammie Quiroz PA  [address] [place] [address] [id number] [phone]    Follow up            Medications Prescribed:  Current Discharge Medication List        START taking these medications    Details   cephalexin 500 MG Oral Cap Take 1 capsule (500 mg total) by mouth 3 (three) times daily for 10 days.  Qty: 30 capsule, Refills: 0      naproxen 500 MG Oral Tab Take 1 tablet (500 mg total) by mouth 2 (two) times daily as needed.  Qty: 20 tablet,  Refills: 0

## 2024-02-27 ENCOUNTER — HOSPITAL ENCOUNTER (OUTPATIENT)
Dept: MAMMOGRAPHY | Age: 59
Discharge: HOME | End: 2024-02-27
Attending: INTERNAL MEDICINE
Payer: COMMERCIAL

## 2024-02-27 DIAGNOSIS — Z12.31 ENCOUNTER FOR SCREENING MAMMOGRAM FOR MALIGNANT NEOPLASM OF BREAST: ICD-10-CM

## 2024-02-27 PROCEDURE — 77063 BREAST TOMOSYNTHESIS BI: CPT | Performed by: INTERNAL MEDICINE

## 2024-02-27 PROCEDURE — 77067 SCR MAMMO BI INCL CAD: CPT | Performed by: INTERNAL MEDICINE

## 2024-04-16 ENCOUNTER — OFFICE VISIT (OUTPATIENT)
Dept: INTERNAL MEDICINE CLINIC | Facility: CLINIC | Age: 59
End: 2024-04-16
Payer: COMMERCIAL

## 2024-04-16 ENCOUNTER — TELEPHONE (OUTPATIENT)
Dept: INTERNAL MEDICINE CLINIC | Facility: CLINIC | Age: 59
End: 2024-04-16

## 2024-04-16 VITALS
WEIGHT: 163.63 LBS | SYSTOLIC BLOOD PRESSURE: 122 MMHG | DIASTOLIC BLOOD PRESSURE: 76 MMHG | BODY MASS INDEX: 27.26 KG/M2 | HEIGHT: 65 IN | RESPIRATION RATE: 16 BRPM | TEMPERATURE: 98 F | HEART RATE: 68 BPM | OXYGEN SATURATION: 98 %

## 2024-04-16 DIAGNOSIS — J01.90 ACUTE NON-RECURRENT SINUSITIS, UNSPECIFIED LOCATION: Primary | ICD-10-CM

## 2024-04-16 DIAGNOSIS — R05.8 PRODUCTIVE COUGH: ICD-10-CM

## 2024-04-16 PROCEDURE — 3074F SYST BP LT 130 MM HG: CPT | Performed by: FAMILY MEDICINE

## 2024-04-16 PROCEDURE — 3008F BODY MASS INDEX DOCD: CPT | Performed by: FAMILY MEDICINE

## 2024-04-16 PROCEDURE — 3078F DIAST BP <80 MM HG: CPT | Performed by: FAMILY MEDICINE

## 2024-04-16 PROCEDURE — 99213 OFFICE O/P EST LOW 20 MIN: CPT | Performed by: FAMILY MEDICINE

## 2024-04-16 RX ORDER — IPRATROPIUM BROMIDE AND ALBUTEROL SULFATE 2.5; .5 MG/3ML; MG/3ML
3 SOLUTION RESPIRATORY (INHALATION) 4 TIMES DAILY
COMMUNITY
Start: 2024-04-07

## 2024-04-16 RX ORDER — METHYLPREDNISOLONE 4 MG/1
TABLET ORAL
Qty: 1 EACH | Refills: 0 | Status: SHIPPED | OUTPATIENT
Start: 2024-04-16

## 2024-04-16 RX ORDER — CODEINE PHOSPHATE AND GUAIFENESIN 10; 100 MG/5ML; MG/5ML
10 SOLUTION ORAL EVERY 8 HOURS PRN
COMMUNITY
Start: 2024-04-07

## 2024-04-16 RX ORDER — AMOXICILLIN AND CLAVULANATE POTASSIUM 875; 125 MG/1; MG/1
1 TABLET, FILM COATED ORAL EVERY 12 HOURS
COMMUNITY
Start: 2024-04-07 | End: 2024-04-16 | Stop reason: ALTCHOICE

## 2024-04-16 NOTE — TELEPHONE ENCOUNTER
S/w patient. Patient c/o lingering symptoms since 4/4.   Feeling fatigued, cough with clear mucus, short of breath with exertion, wheezing at night and chest \"feels heavy\". Overall feels some improvement. She was dx with sinusitis at [facility] [place] on 4/7. States they did chest xray and was told negative for pneumonia. Neg COVID and Influenza at [facility] clinic on 4/5. Patient reports started with cold like symptoms and cough on 4/4. She was prescribed Amox-Clav 875-125 mg, Codeine-Guaifen 10-100mg/5mL sol, and Ipratropi/Alb inh neb sol. She finished abx on Saturday and only used neb solutions initially for about 4 days. Did use again last night with some help.   Denies sinus pressure, fever. Scheduled patient for appt with Tressa for 4/16 for symptom eval/assessment.

## 2024-04-16 NOTE — PROGRESS NOTES
CHIEF COMPLAINT:     Chief Complaint   Patient presents with    Follow - Up     Was seen at the IC- abx are now finished. Having chest tightness, lethargic.        HPI:   Emily Andujar is a 59 year old female .  Patient was seen in an IC in [place] on 4/7/24 and diagnosed with Sinusitis. Pt had a chest xray done which was negative. Pt also had negative covid and flu tests.  Pt was  treated with Augmentin and Cheratussin Ac. Pt finished antibiotics on Saturday.  Patient is currently still having a lot of PND that causes some cough and chest tightness. No fever and sinus pain or pressure At night Pt states she wheezes and her chest rattles. Pt does state using the Duoneb does help her cough and chest tightness/wheeze. But it makes her jittery.  Pt has also been using mucinex which helps thin out the thick mucus/PND.  Current Outpatient Medications   Medication Sig Dispense Refill    ipratropium-albuterol 0.5-2.5 (3) MG/3ML Inhalation Solution Take 3 mL by nebulization 4 (four) times daily.      sertraline 100 MG Oral Tab Take 1 tablet (100 mg total) by mouth daily. 90 tablet 1    estradiol (ESTRACE) 0.1 MG/GM Vaginal Cream Apply a small (pea-sized) amount or ~ 1 gram to urethra daily for 2 weeks, then decrease frequency to applying once a day 3 times a week. 42.5 g 5    ondansetron 4 MG Oral Tablet Dispersible Take 1 tablet (4 mg total) by mouth every 8 (eight) hours as needed for Nausea. 21 tablet 0    temazepam 30 MG Oral Cap Take 1 capsule (30 mg total) by mouth nightly as needed for Sleep. AT BEDTIME 90 capsule 1    guaiFENesin-codeine 100-10 MG/5ML Oral Solution Take 10 mL by mouth every 8 (eight) hours as needed for cough. (Patient not taking: Reported on 4/16/2024)      Upadacitinib ER (RINVOQ) 30 MG Oral Tablet 24 Hr Rinvoq 30 mg tablet,extended release   TAKE 1 TABLET BY MOUTH DAILY (Patient not taking: Reported on 4/16/2024)        Past Medical History:    Breast cancer (HCC)    Closed displaced fracture  of phalanx of right little finger with routine healing, unspecified phalanx, subsequent encounter    COVID-19    Ductal carcinoma in situ of breast    Endometriosis    Laceration of right thumb without foreign body without damage to nail, initial encounter    SVT (supraventricular tachycardia) (HCC)    Vaginal dryness      Social History:  Social History     Socioeconomic History    Marital status:    Occupational History    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     Exercise No     Social Determinants of Health      [facility]    Housing Stability        REVIEW OF SYSTEMS:   GENERAL: Denies fever, chills,weight change, decreased appetite  SKIN: Denies rashes, skin wounds or ulcers.  EYES: Denies blurred vision or double vision  HENT: see HPI  CHEST: Denies chest pain, or palpitations  LUNGS: see HPI  GI: Denies abdominal pain, N/V/C/D.   MUSCULOSKELETAL: no arthralgia or swollen joints  LYMPH:  Denies lymphadenopathy  NEURO: Denies headaches or lightheadedness      EXAM:   /76 (BP Location: Left arm, Patient Position: Sitting, Cuff Size: adult)   Pulse 68   Temp 97.8 °F (36.6 °C) (Temporal)   Resp 16   Ht 5' 5\" (1.651 m)   Wt 163 lb 9.6 oz (74.2 kg)   LMP 07/05/2019   SpO2 98%   BMI 27.22 kg/m²   GENERAL: well developed, well nourished,in no apparent distress  SKIN: no rashes,no suspicious lesions  HEAD: atraumatic, normocephalic  EYES: conjunctiva clear, EOM intact, PERRLA  EARS: TM's clear, no bulging, no retraction, no fluid  NOSE: nostrils patent, no exudates, nasal mucosa pink and noninflamed  THROAT: oral  mucosa pink, moist. No visible dental caries. Posterior pharynx is mild erythematous. PND,no exudates.  NECK: supple, non-tender  LUNGS: clear to auscultation bilaterally, no wheezes or rhonchi. Breathing is non labored.  Occ Raspy cough   CARDIO: RRR without murmur  LYMPH:  no lymphadenopathy.      Physical Exam    ASSESSMENT AND PLAN:     Encounter Diagnoses   Name Primary?    Acute non-recurrent sinusitis, unspecified location Yes    Productive cough        No orders of the defined types were placed in this encounter.      Meds & Refills for this Visit:  Requested Prescriptions     Signed Prescriptions Disp Refills    methylPREDNISolone (MEDROL) 4 MG Oral Tablet Therapy Pack 1 each 0     Sig: As directed.       Imaging & Consults:  None    PLAN:      -Cool Humidified air in room  - start Medrol dose emily, take with food.  -Sleep with head elevated at nightime if coughing    -Push fluids, tea with honey and plenty of rest   -Limit dairy, to avoid increased congestion  -OTC cough medicine such as mucinex  -OTC Tylenol/Ibuprofen   -Soothing cough drops   -Flonase OTC 2 sprays each nostril daily  - an antihistamine like Zyrtec/Allegra for post nasal drip.  Patient to f/u if symptoms worsen or do not improve.  Patient verbalizes understanding of the treatment plan.

## 2024-04-16 NOTE — TELEPHONE ENCOUNTER
Patient called the office reporting that she is still experiencing some respiratory issues since being seen in the immediate care on 4/7. She states her chest xrays did not find anything that determined she has pneumonia. Patient states she still has a lingering cough, fatigue, and some respiratory issues still but slowly getting better. Patient is asking if she should continue the breathing treatments recommended. Please call back at [phone] and advise.

## 2024-04-17 ENCOUNTER — MED REC SCAN ONLY (OUTPATIENT)
Dept: INTERNAL MEDICINE CLINIC | Facility: CLINIC | Age: 59
End: 2024-04-17

## 2024-05-06 DIAGNOSIS — F51.01 PRIMARY INSOMNIA: ICD-10-CM

## 2024-05-06 RX ORDER — TEMAZEPAM 30 MG/1
30 CAPSULE ORAL NIGHTLY PRN
Qty: 90 CAPSULE | Refills: 0 | Status: SHIPPED | OUTPATIENT
Start: 2024-05-06

## 2024-05-06 NOTE — TELEPHONE ENCOUNTER
LOV: 4/16/2024 with RADHA Camarillo  Last Relevant Labs: 11/1/2023 (LIPID)  Filled: 6/19/2023    #90 with 1 refill    No future appointments.

## 2024-07-12 ENCOUNTER — PATIENT MESSAGE (OUTPATIENT)
Dept: INTERNAL MEDICINE CLINIC | Facility: CLINIC | Age: 59
End: 2024-07-12

## 2024-07-15 NOTE — TELEPHONE ENCOUNTER
From: Emily Andujar  To: Margarita Keith  Sent: 7/12/2024 10:33 AM CDT  Subject: Medications     Hi ,   I checked with my insurance & they do not cover any weight loss drugs. A friend joined [facility] & they prescribed her a combo of meds for weight loss . I filled out questionnaire & based off my medical history /medication , they would prescribe me the following combo. Also, I did wean myself off the Zoloft , my rheumatologist thought should eventually try to get off it. I’m doing fine so far.     Metformin 500mg  Bupropion 300mg/Naltrexone 28mg  Topiramate 88mg  Vitamin B12 2mg  Your thoughts on this ?   Emily Andujar

## 2024-07-20 NOTE — TELEPHONE ENCOUNTER
Yes - would be good to schedule office visit or video visit (can be 15 minutes) to discuss side effects of those medications/options

## 2024-07-23 NOTE — TELEPHONE ENCOUNTER
Future Appointments   Date Time Provider Department Center   7/25/2024 10:45 AM Margarita Keith MD [facility] [place]

## 2024-07-25 ENCOUNTER — TELEMEDICINE (OUTPATIENT)
Dept: INTERNAL MEDICINE CLINIC | Facility: CLINIC | Age: 59
End: 2024-07-25
Payer: COMMERCIAL

## 2024-07-25 DIAGNOSIS — F39 MOOD DISORDER (HCC): Chronic | ICD-10-CM

## 2024-07-25 DIAGNOSIS — M05.7A RHEUMATOID ARTHRITIS OF OTHER SITE WITH POSITIVE RHEUMATOID FACTOR (HCC): Chronic | ICD-10-CM

## 2024-07-25 DIAGNOSIS — E66.3 OVERWEIGHT (BMI 25.0-29.9): Primary | ICD-10-CM

## 2024-07-25 RX ORDER — TOPIRAMATE 25 MG/1
25 TABLET ORAL 2 TIMES DAILY
Qty: 60 TABLET | Refills: 2 | Status: SHIPPED | OUTPATIENT
Start: 2024-07-25

## 2024-07-25 NOTE — PROGRESS NOTES
Emily Andujar is a 59 year old female here today for a telemedicine/video visit. Patient is in the [facility] during this visit.  Emily Andujar verbally consents to a Video visit service on 07/25/24.  Patient understands and accepts financial responsibility for any deductible, co-insurance and/or co-pays associated with this service.     Duration of the service: 9 minutes  Start time: 10:45 AM  End time: 10:54 AM    HPI:  Patient presents to discuss several issues.    Overweight status - BMI 28 most recently.  Gained weight on sertraline.  Her insurance does not cover GLP-1's/Zepbound.    Other chronic issues:  Rheumatoid arthritis - following with Rheumatology (Dr. Duong) - on Rinvoq  Mood disorder - tapered off sertraline over the past month at recommendation of Rheumatology.  Overall, she is doing ok anxiety and depression wise.  Has been a bit anxious about her son as he just left to start college.     Past medical, surgical, family, and social histories were reviewed and are unchanged from previous visit.    ROS:  GENERAL: denies fevers/chills/sweats  SKIN: denies any unusual skin lesions  EYES: denies blurred vision or double vision  HEENT: denies nasal congestion, sinus pain or sinus tenderness  LUNGS: denies shortness of breath with exertion  CARDIOVASCULAR: denies chest pain on exertion  GI: denies abdominal pain, denies heartburn, denies diarrhea  MUSCULOSKELETAL: chronic joint pains  NEURO: denies headaches    EXAM:  GENERAL: Alert and oriented, well developed, well nourished,in no apparent distress  SKIN: no rashes,no suspicious lesions of face  HEENT: atraumatic, EOMI, normal lid and conjunctiva  NECK: no grossly visible jvd or thyromegaly  LUNGS: speaking in full sentences, no increased work of breathing, no audible wheezing  NEURO: alert and oriented x 3  PSYCH: pleasant, appropriate mood and affect    ASSESSMENT/PLAN:  1. Overweight (BMI 25.0-29.9)  Patient with weight gain, especially  after starting sertraline.  Tapered off sertraline now.  She checked with insurance - GLP-1's not covered for non-diabetic patients.  Will check labs as below.  Will start on metformin and topiramate.  Re-check weight in 2-3 months.  May consider Contrave or bupropion depending on course of weight.    - Vitamin D, 25-Hydroxy; Future  - Comp Metabolic Panel (14); Future  - B12 AND FOLATE [7065] [Q]; Future  - metFORMIN 500 MG Oral Tab; Take 1 tablet (500 mg total) by mouth 2 (two) times daily with meals.  Dispense: 60 tablet; Refill: 2  - topiramate 25 MG Oral Tab; Take 1 tablet (25 mg total) by mouth 2 (two) times daily.  Dispense: 60 tablet; Refill: 2    2. Mood disorder (HCC)  As above patient tapered off her sertraline on recommendation of Rheumatology.  Tapered down over a month.  Feels ok right now, a little anxious about her son as he just left home to start college.  Will monitor off medication for now.  - Vitamin D, 25-Hydroxy; Future  - B12 AND FOLATE [7065] [Q]; Future    3. Rheumatoid arthritis of other site with positive rheumatoid factor (HCC)  Following with Rheumatology (Dr. Duong).  On DMARD/Rinvoq.    Return to clinic in 2-3 months for follow up on chronic issues/weight check, or earlier as needed for acute issues.    Please note that the following visit was completed using two-way, real-time interactive audio and video communication.  This has been done in good gregoria to provide continuity of care in the best interest of the provider-patient relationship.  There are limitations of this visit as a complete head to toe physical exam could not be performed.  Every conscious effort was taken to allow for sufficient and adequate time.  This billing was spent on reviewing labs, medications, radiology tests and decision making.  Appropriate medical decision-making and tests are ordered as detailed in the plan of care above.

## 2024-07-30 LAB
ALBUMIN/GLOBULIN RATIO: 1.8 (CALC) (ref 1–2.5)
ALBUMIN: 4.4 G/DL (ref 3.6–5.1)
ALKALINE PHOSPHATASE: 48 U/L (ref 37–153)
ALT: 13 U/L (ref 6–29)
AST: 20 U/L (ref 10–35)
BILIRUBIN, TOTAL: 2.2 MG/DL (ref 0.2–1.2)
BUN: 15 MG/DL (ref 7–25)
CALCIUM: 9 MG/DL (ref 8.6–10.4)
CARBON DIOXIDE: 27 MMOL/L (ref 20–32)
CHLORIDE: 106 MMOL/L (ref 98–110)
CREATININE: 0.85 MG/DL (ref 0.5–1.03)
EGFR: 79 ML/MIN/1.73M2
FOLATE, SERUM: 17.2 NG/ML
GLOBULIN: 2.5 G/DL (CALC) (ref 1.9–3.7)
GLUCOSE: 91 MG/DL (ref 65–139)
POTASSIUM: 3.9 MMOL/L (ref 3.5–5.3)
PROTEIN, TOTAL: 6.9 G/DL (ref 6.1–8.1)
SODIUM: 140 MMOL/L (ref 135–146)
VITAMIN B12: 388 PG/ML (ref 200–1100)
VITAMIN D, 25-OH, TOTAL: 32 NG/ML (ref 30–100)

## 2024-07-31 PROBLEM — E80.4 GILBERT'S SYNDROME: Chronic | Status: ACTIVE | Noted: 2022-04-09

## 2024-09-25 DIAGNOSIS — F51.01 PRIMARY INSOMNIA: ICD-10-CM

## 2024-09-25 RX ORDER — TEMAZEPAM 30 MG
30 CAPSULE ORAL NIGHTLY PRN
Qty: 90 CAPSULE | Refills: 1 | Status: SHIPPED | OUTPATIENT
Start: 2024-09-25

## 2024-09-25 NOTE — TELEPHONE ENCOUNTER
TEMAZEPAM 30MG CAPSULES          Will file in chart as: TEMAZEPAM 30 MG Oral Cap    Sig: TAKE 1 CAPSULE(30 MG) BY MOUTH EVERY NIGHT AT BEDTIME AS NEEDED FOR SLEEP    Disp: 90 capsule    Refills: 0 (Pharmacy requested: Not specified)    Start: 9/25/2024    Class: Normal    Non-formulary For: Primary insomnia    Last ordered: 4 months ago (5/6/2024) by Margarita Keith MD    Last refill: 5/6/2024    Rx #: [id number]    Controlled Substance Medication [id number]/25/2024 09:01 AM    This medication is a controlled substance - forward to provider to refill      Telemed 7/25/24   RTC 2-3 months  Filled 5/6/24 90 caps 0 refills   No future appointments.

## 2024-10-10 ENCOUNTER — HOSPITAL ENCOUNTER (EMERGENCY)
Age: 59
Discharge: HOME | End: 2024-10-10
Attending: EMERGENCY MEDICINE
Payer: COMMERCIAL

## 2024-10-10 VITALS
DIASTOLIC BLOOD PRESSURE: 79 MMHG | HEART RATE: 90 BPM | OXYGEN SATURATION: 98 % | WEIGHT: 155 LBS | BODY MASS INDEX: 25.83 KG/M2 | TEMPERATURE: 98 F | HEIGHT: 65 IN | SYSTOLIC BLOOD PRESSURE: 148 MMHG | RESPIRATION RATE: 15 BRPM

## 2024-10-10 DIAGNOSIS — J06.9 VIRAL URI WITH COUGH: Primary | ICD-10-CM

## 2024-10-10 DIAGNOSIS — R51.9 SINUS HEADACHE: ICD-10-CM

## 2024-10-10 LAB
POCT INFLUENZA A: NEGATIVE
POCT INFLUENZA B: NEGATIVE
SARS-COV-2 RNA RESP QL NAA+PROBE: NOT DETECTED

## 2024-10-10 PROCEDURE — 99283 EMERGENCY DEPT VISIT LOW MDM: CPT

## 2024-10-10 PROCEDURE — 87502 INFLUENZA DNA AMP PROBE: CPT | Performed by: EMERGENCY MEDICINE

## 2024-10-10 RX ORDER — BENZONATATE 200 MG/1
200 CAPSULE ORAL 3 TIMES DAILY PRN
Qty: 30 CAPSULE | Refills: 0 | Status: SHIPPED | OUTPATIENT
Start: 2024-10-10 | End: 2024-11-09

## 2024-10-10 RX ORDER — FLUTICASONE PROPIONATE 50 MCG
2 SPRAY, SUSPENSION (ML) NASAL DAILY
Qty: 16 G | Refills: 0 | Status: SHIPPED | OUTPATIENT
Start: 2024-10-10 | End: 2024-11-09

## 2024-10-10 RX ORDER — NAPROXEN 500 MG/1
500 TABLET ORAL 2 TIMES DAILY PRN
Qty: 20 TABLET | Refills: 0 | Status: SHIPPED | OUTPATIENT
Start: 2024-10-10 | End: 2024-10-20

## 2024-10-10 NOTE — ED PROVIDER NOTES
Patient Seen in: [facility] In [place]      History     Chief Complaint   Patient presents with    Headache     Stated Complaint: headache,URI    Subjective:   HPI       59-year-old female with a past medical history as below presents with fever that started a week ago followed by runny nose/nasal congestion and cough a couple days later.  Patient states fever went up to 102 last weekend but had resolved by Monday.  She also reports some sinus pain/pressure mainly over the left eyebrow area.  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  Denies sore throat.  Denies vomiting or diarrhea.    Objective:     Past Medical History:    Breast cancer (HCC)    Closed displaced fracture of phalanx of right little finger with routine healing, unspecified phalanx, subsequent encounter    COVID-19    Ductal carcinoma in situ of breast    Endometriosis    Laceration of right thumb without foreign body without damage to nail, initial encounter    SVT (supraventricular tachycardia) (HCC)    Vaginal dryness              Past Surgical History:   Procedure Laterality Date           and     Colonoscopy,diagnostic N/A 2016    Procedure: COLONOSCOPY,POSSIBLE BIOPSY,POSSIBLE POLYPECTOMY;  Surgeon: Sanju Patten MD;  Location: [facility]    Endovas non-cardiac abl cath      Lumpectomy right Right 2014    DCIS    Josee biopsy stereo nodule 1 site right (cpt=19081) Right 2014    Other surgical history      Cardiac ablation    Other surgical history      Cervical ablation    Other surgical history Right 2017    Breast reconstruction     Radiation right Right 2014    right lump with rad    Stereotactic breast biopsy  3-4-14 [facility]    Right breast -- 12 o'clock position                Social History     Socioeconomic History    Marital status:    Occupational History    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     Exercise No     Social Drivers of Health      [facility]    Housing Stability                  Physical Exam     ED Triage Vitals [10/10/24 1051]   /79   Pulse 90   Resp 15   Temp 97.9 °F (36.6 °C)   Temp src Temporal   SpO2 98 %   O2 Device None (Room air)       Current Vitals:   Vital Signs  BP: 148/79  Pulse: 90  Resp: 15  Temp: 97.9 °F (36.6 °C)  Temp src: Temporal    Oxygen Therapy  SpO2: 98 %  O2 Device: None (Room air)        Physical Exam  Vitals and nursing note reviewed.   Constitutional:       Appearance: She is well-developed.   HENT:      Head: Normocephalic and atraumatic.      Mouth/Throat:      Mouth: Mucous membranes are moist.      Pharynx: No oropharyngeal exudate or posterior oropharyngeal erythema.   Eyes:      General: No scleral icterus.  Cardiovascular:      Rate and Rhythm: Normal rate and regular rhythm.   Pulmonary:      Effort: Pulmonary effort is normal.      Breath sounds: Normal breath sounds.   Musculoskeletal:      Cervical back: Neck supple.   Lymphadenopathy:      Cervical: No cervical adenopathy.   Skin:     General: Skin is warm and dry.   Neurological:      General: No focal deficit present.      Mental Status: She is alert and oriented to person, place, and time.      Cranial Nerves: No cranial nerve deficit.      Motor: No weakness.   Psychiatric:         Mood and Affect: Mood normal.         Behavior: Behavior normal.             ED Course     Labs Reviewed   RAPID SARS-COV-2 BY PCR - Normal   POCT FLU TEST - Normal    Narrative:     This assay is a rapid molecular in vitro test utilizing nucleic acid  amplification of influenza A and B viral RNA.                   MDM         59-year-old female with a past medical history as below presents with fever that started a week ago followed by runny nose/nasal congestion and cough a couple days later.      Differential includes but is not limited to COVID, influenza, other viral respiratory infection, sinusitis    COVID/flu negative.    Symptoms are consistent with viral respiratory infection with cough and sinus headache.  Low suspicion for acute bacterial sinusitis this patient's fevers resolved and she is already taking azithromycin.  Instructed on symptomatic and supportive care.  Will DC home with Rx for Flonase, naproxen and Tessalon.  Instructed to follow-up with PCP in 2 to 3 days.  Return precautions discussed.        Medical Decision Making  Amount and/or Complexity of Data Reviewed  Labs: ordered. Decision-making details documented in ED Course.    Risk  Prescription drug management.        Disposition and Plan     Clinical Impression:  1. Viral URI with cough    2. Sinus headache         Disposition:  Discharge  10/10/2024 12:14 pm    Follow-up:  Margarita Keith MD  [address] [address]  [phone]    Schedule an appointment as soon as possible for a visit in 3 day(s)            Medications Prescribed:  Current Discharge Medication List        START taking these medications    Details   fluticasone propionate 50 MCG/ACT Nasal Suspension 2 sprays by Nasal route daily.  Qty: 16 g, Refills: 0      benzonatate 200 MG Oral Cap Take 1 capsule (200 mg total) by mouth 3 (three) times daily as needed for cough.  Qty: 30 capsule, Refills: 0      naproxen 500 MG Oral Tab Take 1 tablet (500 mg total) by mouth 2 (two) times daily as needed.  Qty: 20 tablet, Refills: 0                 Supplementary Documentation:

## 2024-12-10 ENCOUNTER — PATIENT MESSAGE (OUTPATIENT)
Dept: INTERNAL MEDICINE CLINIC | Facility: CLINIC | Age: 59
End: 2024-12-10

## 2024-12-11 NOTE — TELEPHONE ENCOUNTER
Spoke to patient who was seen at [facility] on 12/7 and was diagnosed with bilateral ear infection.     Patient was prescribed oral amoxicillin BIDx 7days. Patient has had 9 doses of antibiotic but reports not getting any relief from sinus and ear pressure. Patient continues to have congestion, runny nose and exhaustion.     Patient denies fever or chills and states she feels about the same as before she started antibiotics.    Patient requesting PCP recommendation. Give antibiotics more time? Drops?

## 2024-12-11 NOTE — TELEPHONE ENCOUNTER
We can try Augmentin instead of amoxicillin.  Prescription sent in.  Should follow up in office if that does not help.

## 2024-12-23 ENCOUNTER — MED REC SCAN ONLY (OUTPATIENT)
Dept: INTERNAL MEDICINE CLINIC | Facility: CLINIC | Age: 59
End: 2024-12-23

## 2025-01-03 ENCOUNTER — LAB ENCOUNTER (OUTPATIENT)
Dept: LAB | Age: 60
End: 2025-01-03
Attending: INTERNAL MEDICINE
Payer: COMMERCIAL

## 2025-01-03 ENCOUNTER — TELEPHONE (OUTPATIENT)
Dept: INTERNAL MEDICINE CLINIC | Facility: CLINIC | Age: 60
End: 2025-01-03

## 2025-01-03 ENCOUNTER — HOSPITAL ENCOUNTER (OUTPATIENT)
Dept: GENERAL RADIOLOGY | Age: 60
Discharge: HOME | End: 2025-01-03
Attending: INTERNAL MEDICINE
Payer: COMMERCIAL

## 2025-01-03 DIAGNOSIS — R06.02 SHORTNESS OF BREATH: ICD-10-CM

## 2025-01-03 DIAGNOSIS — R35.0 URINARY FREQUENCY: Primary | ICD-10-CM

## 2025-01-03 DIAGNOSIS — R35.0 URINARY FREQUENCY: ICD-10-CM

## 2025-01-03 LAB
ANION GAP SERPL CALC-SCNC: 6 MMOL/L (ref 0–18)
BASOPHILS # BLD AUTO: 0.03 X10(3) UL (ref 0–0.2)
BASOPHILS NFR BLD AUTO: 0.3 %
BILIRUB UR QL STRIP.AUTO: NEGATIVE
BUN BLD-MCNC: 10 MG/DL (ref 9–23)
CALCIUM BLD-MCNC: 9.5 MG/DL (ref 8.7–10.4)
CHLORIDE SERPL-SCNC: 101 MMOL/L (ref 98–112)
CLARITY UR REFRACT.AUTO: CLEAR
CO2 SERPL-SCNC: 29 MMOL/L (ref 21–32)
COLOR UR AUTO: COLORLESS
CREAT BLD-MCNC: 0.95 MG/DL
EGFRCR SERPLBLD CKD-EPI 2021: 69 ML/MIN/1.73M2 (ref 60–?)
EOSINOPHIL # BLD AUTO: 0.19 X10(3) UL (ref 0–0.7)
EOSINOPHIL NFR BLD AUTO: 2.2 %
ERYTHROCYTE [DISTWIDTH] IN BLOOD BY AUTOMATED COUNT: 12.3 %
FASTING STATUS PATIENT QL REPORTED: NO
GLUCOSE BLD-MCNC: 91 MG/DL (ref 70–99)
GLUCOSE UR STRIP.AUTO-MCNC: NORMAL MG/DL
HCT VFR BLD AUTO: 37.7 %
HGB BLD-MCNC: 12.8 G/DL
IMM GRANULOCYTES # BLD AUTO: 0.02 X10(3) UL (ref 0–1)
IMM GRANULOCYTES NFR BLD: 0.2 %
KETONES UR STRIP.AUTO-MCNC: NEGATIVE MG/DL
LEUKOCYTE ESTERASE UR QL STRIP.AUTO: 75
LYMPHOCYTES # BLD AUTO: 2 X10(3) UL (ref 1–4)
LYMPHOCYTES NFR BLD AUTO: 23.3 %
MCH RBC QN AUTO: 30.8 PG (ref 26–34)
MCHC RBC AUTO-ENTMCNC: 34 G/DL (ref 31–37)
MCV RBC AUTO: 90.8 FL
MONOCYTES # BLD AUTO: 0.66 X10(3) UL (ref 0.1–1)
MONOCYTES NFR BLD AUTO: 7.7 %
NEUTROPHILS # BLD AUTO: 5.69 X10 (3) UL (ref 1.5–7.7)
NEUTROPHILS # BLD AUTO: 5.69 X10(3) UL (ref 1.5–7.7)
NEUTROPHILS NFR BLD AUTO: 66.3 %
NITRITE UR QL STRIP.AUTO: NEGATIVE
OSMOLALITY SERPL CALC.SUM OF ELEC: 281 MOSM/KG (ref 275–295)
PH UR STRIP.AUTO: 7 [PH] (ref 5–8)
PLATELET # BLD AUTO: 246 10(3)UL (ref 150–450)
POTASSIUM SERPL-SCNC: 4.4 MMOL/L (ref 3.5–5.1)
PROT UR STRIP.AUTO-MCNC: NEGATIVE MG/DL
RBC # BLD AUTO: 4.15 X10(6)UL
SODIUM SERPL-SCNC: 136 MMOL/L (ref 136–145)
SP GR UR STRIP.AUTO: <1.005 (ref 1–1.03)
UROBILINOGEN UR STRIP.AUTO-MCNC: NORMAL MG/DL
WBC # BLD AUTO: 8.6 X10(3) UL (ref 4–11)

## 2025-01-03 PROCEDURE — 36415 COLL VENOUS BLD VENIPUNCTURE: CPT

## 2025-01-03 PROCEDURE — 81001 URINALYSIS AUTO W/SCOPE: CPT

## 2025-01-03 PROCEDURE — 85025 COMPLETE CBC W/AUTO DIFF WBC: CPT

## 2025-01-03 PROCEDURE — 87186 SC STD MICRODIL/AGAR DIL: CPT

## 2025-01-03 PROCEDURE — 71046 X-RAY EXAM CHEST 2 VIEWS: CPT | Performed by: INTERNAL MEDICINE

## 2025-01-03 PROCEDURE — 87077 CULTURE AEROBIC IDENTIFY: CPT

## 2025-01-03 PROCEDURE — 87086 URINE CULTURE/COLONY COUNT: CPT

## 2025-01-03 PROCEDURE — 80048 BASIC METABOLIC PNL TOTAL CA: CPT

## 2025-01-03 NOTE — TELEPHONE ENCOUNTER
We can check labs and x-ray.  Does not have to fast for urine/blood tests.  Chest x-ray she [facility]

## 2025-01-03 NOTE — TELEPHONE ENCOUNTER
RP: Please advise on next steps concerning congestion/cough and possible UTI. Thanks!     Pt called in.   Pt states she has been sick since weekend after thanksgiving. Finished augmentin and saw ENT at [facility].   He stated ears clear, Just a lot of sinus congestion.  He prescribed her 10 days of prednisone. Feeling better. Last pill was Sunday.   Monday woke up more and more congested.   By Jan 1, she is coughing a lot, felt SOB when going up the stairs, feels more winded easily.  Started taking Mucinex D and Afrin for a few days, helps some. Pt does sound congested on the phone.  Also on 1/1, pt started noticiing urinary pressure. Unsure if it's from all the abx and steroids. Feels burning with uriniation, bloating and pressure.   Today she took AZO otc test and she stated it came back positive for UTI.    Pt is wondering if she needs a CXR?   Also no openings, there is a 15 min slot on Tuesday, would you like pt squeezed in?   Pended UA/UC

## 2025-01-03 NOTE — TELEPHONE ENCOUNTER
Relayed message and recs to pt. Pt will go today for labs and imaging.    Pt understands s/s of when to seek immediate attention at ER.

## 2025-01-07 ENCOUNTER — TELEPHONE (OUTPATIENT)
Dept: INTERNAL MEDICINE CLINIC | Facility: CLINIC | Age: 60
End: 2025-01-07

## 2025-01-07 RX ORDER — SULFAMETHOXAZOLE AND TRIMETHOPRIM 800; 160 MG/1; MG/1
1 TABLET ORAL 2 TIMES DAILY
Qty: 14 TABLET | Refills: 0 | Status: SHIPPED | OUTPATIENT
Start: 2025-01-07 | End: 2025-01-14

## 2025-01-07 NOTE — TELEPHONE ENCOUNTER
----- Message from Quoc Templeton sent at 5/7/2024  9:58 AM CDT -----  Type:  Patient Returning Call    Who Called:pt  Who Left Message for Patient:Dr Osuna Or Nurse   Does the patient know what this is regarding?:last Gi appointment needing more info   Would the patient rather a call back or a response via [facility]? call  Best Call Back Number:[phone]  Additional Information:   RP: Looks like Urine culture resulted today positive for klebsiella pneumoniae. Patient is leaving for [place] on Thursday, requesting meds be given before leaving. Please advise, TY

## 2025-01-07 NOTE — TELEPHONE ENCOUNTER
Spoke with patient regarding results and recommendations per Dr. Keith. Patient aware Bactrim sent to pharmacy.

## 2025-01-07 NOTE — TELEPHONE ENCOUNTER
Final culture results still pending - will send in Bactrim prescription she can start today, but based on final culture may need to switch to a different antibiotics.  Can always send different antibiotic to a [place] pharmacy if needed

## 2025-01-07 NOTE — TELEPHONE ENCOUNTER
Pt would like to have Dr. Keith go over her recent labs in the event she may need medication. She currently feels uncomfortable and is traveling Thursday (1/9/25) morning to [place]. Would like to  an Rx before then if needed.    Pt would like a call back.    Pharmacy of choice [id number]

## 2025-06-17 ENCOUNTER — PATIENT MESSAGE (OUTPATIENT)
Dept: INTERNAL MEDICINE CLINIC | Facility: CLINIC | Age: 60
End: 2025-06-17

## 2025-06-17 DIAGNOSIS — M05.7A RHEUMATOID ARTHRITIS OF OTHER SITE WITH POSITIVE RHEUMATOID FACTOR (HCC): Primary | ICD-10-CM

## 2025-06-17 DIAGNOSIS — Z00.00 LABORATORY EXAMINATION ORDERED AS PART OF A COMPLETE PHYSICAL EXAMINATION: ICD-10-CM

## 2025-06-18 NOTE — TELEPHONE ENCOUNTER
RP: Pended physical labs AND labs requested by pt's rheumatologist. Please review and sign if agree, TY!

## 2025-07-10 LAB
ABSOLUTE BASOPHILS: 30 CELLS/UL (ref 0–200)
ABSOLUTE EOSINOPHILS: 41 CELLS/UL (ref 15–500)
ABSOLUTE LYMPHOCYTES: 1010 CELLS/UL (ref 850–3900)
ABSOLUTE MONOCYTES: 226 CELLS/UL (ref 200–950)
ABSOLUTE NEUTROPHILS: 2394 CELLS/UL (ref 1500–7800)
ALBUMIN/GLOBULIN RATIO: 1.8 (CALC) (ref 1–2.5)
ALBUMIN: 4.6 G/DL (ref 3.6–5.1)
ALKALINE PHOSPHATASE: 57 U/L (ref 37–153)
ALT: 23 U/L (ref 6–29)
AST: 26 U/L (ref 10–35)
BASOPHILS: 0.8 %
BILIRUBIN, TOTAL: 1.9 MG/DL (ref 0.2–1.2)
BUN: 14 MG/DL (ref 7–25)
CALCIUM: 9.3 MG/DL (ref 8.6–10.4)
CARBON DIOXIDE: 28 MMOL/L (ref 20–32)
CARDIO CRP(R): 0.2 MG/L
CHLORIDE: 105 MMOL/L (ref 98–110)
CHOL/HDLC RATIO: 2.3 (CALC)
CHOLESTEROL, TOTAL: 208 MG/DL
CREATINE KINASE, TOTAL: 102 U/L (ref 21–240)
CREATININE: 0.9 MG/DL (ref 0.5–1.03)
CYCLIC CITRULLINATED$PEPTIDE (CCP) AB (IGG): 30 UNITS
EGFR: 74 ML/MIN/1.73M2
EOSINOPHILS: 1.1 %
GLOBULIN: 2.6 G/DL (CALC) (ref 1.9–3.7)
GLUCOSE: 89 MG/DL (ref 65–99)
HDL CHOLESTEROL: 92 MG/DL
HEMATOCRIT: 38.5 % (ref 35–45)
HEMOGLOBIN A1C: 5.4 %
HEMOGLOBIN: 12.2 G/DL (ref 11.7–15.5)
LDL-CHOLESTEROL: 101 MG/DL (CALC)
LYMPHOCYTES: 27.3 %
MCH: 29.9 PG (ref 27–33)
MCHC: 31.7 G/DL (ref 32–36)
MCV: 94.4 FL (ref 80–100)
MONOCYTES: 6.1 %
MPV: 11.3 FL (ref 7.5–12.5)
NEUTROPHILS: 64.7 %
NON-HDL CHOLESTEROL: 116 MG/DL (CALC)
PLATELET COUNT: 242 THOUSAND/UL (ref 140–400)
POTASSIUM: 4.5 MMOL/L (ref 3.5–5.3)
PROTEIN, TOTAL: 7.2 G/DL (ref 6.1–8.1)
RDW: 12.2 % (ref 11–15)
RED BLOOD CELL COUNT: 4.08 MILLION/UL (ref 3.8–5.1)
RHEUMATOID FACTOR: 67 IU/ML
SED RATE BY MODIFIED$WESTERGREN: 2 MM/H
SODIUM: 139 MMOL/L (ref 135–146)
TRIGLYCERIDES: 65 MG/DL
WHITE BLOOD CELL COUNT: 3.7 THOUSAND/UL (ref 3.8–10.8)

## 2025-07-14 ENCOUNTER — OFFICE VISIT (OUTPATIENT)
Dept: INTERNAL MEDICINE CLINIC | Facility: CLINIC | Age: 60
End: 2025-07-14
Payer: COMMERCIAL

## 2025-07-14 VITALS
RESPIRATION RATE: 12 BRPM | OXYGEN SATURATION: 100 % | HEIGHT: 64.38 IN | WEIGHT: 158.81 LBS | SYSTOLIC BLOOD PRESSURE: 125 MMHG | BODY MASS INDEX: 26.78 KG/M2 | HEART RATE: 69 BPM | DIASTOLIC BLOOD PRESSURE: 66 MMHG | TEMPERATURE: 98 F

## 2025-07-14 DIAGNOSIS — E80.4 GILBERT'S SYNDROME: Chronic | ICD-10-CM

## 2025-07-14 DIAGNOSIS — Z12.31 ENCOUNTER FOR SCREENING MAMMOGRAM FOR MALIGNANT NEOPLASM OF BREAST: ICD-10-CM

## 2025-07-14 DIAGNOSIS — Z12.4 SCREENING FOR MALIGNANT NEOPLASM OF CERVIX: ICD-10-CM

## 2025-07-14 DIAGNOSIS — M05.7A RHEUMATOID ARTHRITIS OF OTHER SITE WITH POSITIVE RHEUMATOID FACTOR (HCC): Chronic | ICD-10-CM

## 2025-07-14 DIAGNOSIS — Z00.00 ENCOUNTER FOR PREVENTATIVE ADULT HEALTH CARE EXAMINATION: Primary | ICD-10-CM

## 2025-07-14 PROCEDURE — 3008F BODY MASS INDEX DOCD: CPT | Performed by: INTERNAL MEDICINE

## 2025-07-14 PROCEDURE — 3074F SYST BP LT 130 MM HG: CPT | Performed by: INTERNAL MEDICINE

## 2025-07-14 PROCEDURE — 3078F DIAST BP <80 MM HG: CPT | Performed by: INTERNAL MEDICINE

## 2025-07-14 PROCEDURE — 99396 PREV VISIT EST AGE 40-64: CPT | Performed by: INTERNAL MEDICINE

## 2025-07-14 NOTE — PROGRESS NOTES
.Emily Andujar is a 60 year old female.   HPI:     Patient presents for CPX/wellness examination.  Diet: Generally healthy  Exercise: Occasional exercise  Vision: Up to date  Dental: Up to date    Chronic issues:  Rheumatoid arthritis - had labs done, rheumatoid antibodies higher.  Gilbert's - mildly elevated bilirubin again on labs.    Past medical, family, surgical and social history were reviewed as listed in the chart, and are unchanged from previous visit.    REVIEW OF SYSTEMS:   GENERAL/ const: no fevers/chills, no unintentional weight loss  SKIN: denies any unusual skin lesions  EYES:no vision problems  HEENT: denies sinus pain or sinus tenderness  LUNGS: denies shortness of breath   CARDIOVASCULAR: denies chest pain  GI: denies nausea/emesis/ abdominal pain diarrhea constipation  : denies dysuria   MUSCULOSKELETAL: RA/chronic joint pains  NEURO: denies headaches  PSYCHIATRIC: denies issues  ENDOCRINE: no hot/cold intolerance  ALLERGY: Allergies[1]  PAST HISTORY:     Medications - Current[2]  Medical:  has a past medical history of Breast cancer (HCC) (2014), Closed displaced fracture of phalanx of right little finger with routine healing, unspecified phalanx, subsequent encounter (2021), COVID-19 (2021), Ductal carcinoma in situ of breast (), Endometriosis (2015), Laceration of right thumb without foreign body without damage to nail, initial encounter (2021), SVT (supraventricular tachycardia) (), and Vaginal dryness (2023).  Surgical:  has a past surgical history that includes ; stereotactic breast biopsy (3-4-14 [place] [facility]); colonoscopy,diagnostic (N/A, 2016); other surgical history (); other surgical history (); other surgical history (Right, 2017); endovas non-cardiac abl cath; lumpectomy right (Right, 2014); radiation right (Right, 2014); and raffy biopsy stereo nodule 1 site right (cpt=19081) (Right, 2014).  Family: family history includes  Breast Cancer (age of onset: 48) in her self; Breast Cancer (age of onset: 50) in her maternal cousin female; Breast Cancer (age of onset: 55) in her sister; Breast Cancer (age of onset: 72) in her maternal aunt; Cancer in her father; DCIS (age of onset: 48) in her self; Diabetes in her father; Healthy in her brother and sister; Hypertension in her father and mother.  Social:  reports that she has never smoked. She has never used smokeless tobacco. She reports current alcohol use. She reports that she does not use drugs.  Wt Readings from Last 6 Encounters:   07/14/25 158 lb 12.8 oz (72 kg)   10/10/24 155 lb (70.3 kg)   04/16/24 163 lb 9.6 oz (74.2 kg)   02/09/24 155 lb (70.3 kg)   09/20/23 155 lb (70.3 kg)   04/04/23 158 lb (71.7 kg)       EXAM:   /66 (BP Location: Left arm, Patient Position: Sitting, Cuff Size: adult)   Pulse 69   Temp 98.1 °F (36.7 °C) (Temporal)   Resp 12   Ht 5' 4.38\" (1.635 m)   Wt 158 lb 12.8 oz (72 kg)   LMP 07/05/2019   SpO2 100%   Breastfeeding No   BMI 26.94 kg/m²   GENERAL: Alert and oriented, well developed, well nourished,in no apparent distress  SKIN: no rashes,no suspicious lesions  HEENT: atraumatic, PERRLA, EOMI, normal lid and conjunctiva, normal external canals and tympanic membranes bilaterally  NECK: supple, no jvd, no thyromegaly, no palpable/tender cervical lymphadenopathy  LUNGS: clear to auscultation bilaterally, no wheezing/rubs  CARDIO: RRR without murmurs.  No clubbing, cyanosis or edema.  GI: soft non tender nondistended no hepatosplenomegaly, bowel sounds throughout  NEURO: CN II-XII intact, 5/5 strength all extremities  MS: Full ROM  PSYCH: pleasant, appropriate mood and affect  ASSESSMENT AND PLAN:   1.  Encounter for preventative adult health examination  2. Encounter for screening mammogram for malignant neoplasm of breast  3. Screening for malignant neoplasm of cervix  Age appropriate health guidance and counseling provided.  Screening labs done  last week.  Body mass index is 26.94 kg/m².  Declines Shingrix.  Mammogram ordered. Pap smear due at end of the year - information provided for our [facility] gynecologists.  - [facility] NICOLAS 2D+3D SCREENING BILAT (CPT=77067/98029); Future    4. Rheumatoid arthritis of other site with positive rheumatoid factor (HCC)  Chronic issue.  Follows with Rheumatology (Dr. Duong); on Rinvoq.  Her RA and CCP antibodies are high.  CRP/ESR normal.  Will fax lab results to Rheumatology.    5. Gilbert's syndrome  Chronic issue.  Isolated bilirubin elevation on labs. Monitor for now.  \  Patient Care Team:  Margarita Keith MD as PCP - General (Internal Medicine)  Sanju Patten MD as Consulting Physician (SURGERY, GENERAL)  Hank Sheets MD as Consulting Physician (OBSTETRICS & GYNECOLOGY)  Ramsey Duong as Consulting Physician (RHEUMATOLOGY)  Pavan Lee PA as Physician Assistant (DERMATOLOGY)  The patient indicates understanding of these issues and agrees to the plan.  The patient is asked to return to clinic in 12 months for follow up on chronic issues/CPX, or earlier if acute issues arise.    Margarita Keith MD             [1] No Known Allergies  [2]   Current Outpatient Medications:     temazepam 30 MG Oral Cap, Take 1 capsule (30 mg total) by mouth nightly as needed for Sleep. AT BEDTIME, Disp: 90 capsule, Rfl: 1    estradiol (ESTRACE) 0.1 MG/GM Vaginal Cream, Apply a small (pea-sized) amount or ~ 1 gram to urethra daily for 2 weeks, then decrease frequency to applying once a day 3 times a week., Disp: 42.5 g, Rfl: 5    Upadacitinib ER (RINVOQ) 30 MG Oral Tablet 24 Hr, , Disp: , Rfl:

## 2025-07-14 NOTE — PATIENT INSTRUCTIONS
- LDL/bad cholesterol was just slightly high (101, normal 100 or less).  Will monitor for now.  - Rheumatoid factor and CCP tests were high, but your inflammatory tests (CRP and ESR) were actually normal.  Will fax these results to Dr. Duong.   - Other tests were all fine  - Schedule mammogram through [facility] or call central scheduling at [phone]  - Follow up with our [facility]necologists for updated pap smear around the new year:  Arun Biswas, others  [address]  [place] [address]  [phone]    [address]  [place] [address]  [phone]    Indio Ayala, others  [address]  [place], [place] [address] [phone]    [address]  [address]  [place], [place] [address] [phone]    - Follow up with me in 1 year for physical/chronic issues; follow up earlier as needed.    It was a pleasure seeing you in the clinic today.  Thank you for choosing the [facility] [place] office for your healthcare needs. Please call at [phone] with any questions or concerns.    Margarita Keith MD

## 2025-07-16 PROBLEM — D22.9 MULTIPLE BENIGN MELANOCYTIC NEVI: Status: RESOLVED | Noted: 2019-06-04 | Resolved: 2025-07-16

## 2025-07-16 PROBLEM — G89.29 CHRONIC BILATERAL LOW BACK PAIN WITHOUT SCIATICA: Chronic | Status: ACTIVE | Noted: 2022-03-29

## 2025-07-16 PROBLEM — M47.812 CERVICAL SPONDYLOSIS: Status: RESOLVED | Noted: 2019-09-19 | Resolved: 2025-07-16

## 2025-07-16 PROBLEM — M54.50 CHRONIC BILATERAL LOW BACK PAIN WITHOUT SCIATICA: Chronic | Status: ACTIVE | Noted: 2022-03-29

## 2025-07-16 PROBLEM — G89.29 CHRONIC BILATERAL LOW BACK PAIN WITHOUT SCIATICA: Chronic | Status: RESOLVED | Noted: 2022-03-29 | Resolved: 2025-07-16

## 2025-07-16 PROBLEM — M54.50 CHRONIC BILATERAL LOW BACK PAIN WITHOUT SCIATICA: Chronic | Status: RESOLVED | Noted: 2022-03-29 | Resolved: 2025-07-16

## 2025-07-16 PROBLEM — F39 MOOD DISORDER: Chronic | Status: RESOLVED | Noted: 2017-06-13 | Resolved: 2025-07-16

## 2025-07-18 DIAGNOSIS — F51.01 PRIMARY INSOMNIA: ICD-10-CM

## 2025-07-19 DIAGNOSIS — F51.01 PRIMARY INSOMNIA: ICD-10-CM

## 2025-07-19 RX ORDER — TEMAZEPAM 30 MG/1
30 CAPSULE ORAL NIGHTLY PRN
Qty: 90 CAPSULE | Refills: 1 | Status: CANCELLED | OUTPATIENT
Start: 2025-07-19

## 2025-07-21 RX ORDER — TEMAZEPAM 30 MG/1
30 CAPSULE ORAL NIGHTLY PRN
Qty: 90 CAPSULE | Refills: 1 | Status: SHIPPED | OUTPATIENT
Start: 2025-07-21

## 2025-07-21 NOTE — TELEPHONE ENCOUNTER
LOV: 7/14/2025 with Dr. Keith  RTC: 12 months  Last Relevant Labs: 7/9/2025  Filled: 9/25/2024    #90 with 1 refill    Future Appointments   Date Time Provider Department Center   8/11/2025  6:00 PM PF [facility] RM1 PF KATELYN Siddiqi

## (undated) DIAGNOSIS — F51.01 PRIMARY INSOMNIA: ICD-10-CM

## (undated) DIAGNOSIS — R17 ELEVATED BILIRUBIN: Primary | ICD-10-CM

## (undated) NOTE — LETTER
05/06/23        [address] [place]  [place] [address]      Dear Calvin Jones,    15[facility] records indicate that you have outstanding lab work and or testing that was ordered for you and has not yet been completed:  Orders Placed This Encounter      Lipid Panel    To provide you with the best possible care, please complete these orders at your earliest convenience. If you have recently completed these orders please disregard this letter. If you have any questions please call the office at Dept: [phone].      Thank you,       Kj Fields MD

## (undated) NOTE — LETTER
02/18/20        [address] [place] [id number]      Dear Kapil Cox,    97[facility] records indicate that you have outstanding lab work and or testing that was ordered for you and has not yet been completed: Non Fasting Lab Work   To complet

## (undated) NOTE — LETTER
12/01/20        [address] [place] [id number]      Dear Rao Brown,    4[facility] records indicate that you have outstanding lab work and or testing that was ordered for you and has not yet been completed:  Orders Placed This Encounter

## (undated) NOTE — MR AVS SNAPSHOT
[place]  [address] [facility] [address]  [address] [facility] [id number] [phone]               Thank you for choosing us for your health care visit with Catarino Seaman MD.  We are glad to serve you and happy to provide you with this summa Return in about 1 year (around 6/13/2018). Scheduling Instructions     Tuesday June 13, 2017     Imaging:  XR DEXA BONE DENSITOMETRY (CPT=77080)    Instructions:   To schedule an appointment for your radiology test please call [address] your Zip Code and Date of Birth to complete the sign-up process. If you do not sign up before the expiration date, you must request a new code.     Your unique doxo Access Code: [email or web address]  Expires: 8/12/2017  3:11 PM    If you have questions, you can c

## (undated) NOTE — LETTER
09/19/18        [address] [place]  [facility] [address]      Dear Gómez Duong records indicate that you have outstanding lab work and or testing that was ordered for you and has not yet been completed:  Orders Placed This Encounter      [facility]

## (undated) NOTE — LETTER
Date & Time: 9/25/2021, 8:27 PM  Patient: Christopher Marques  Encounter Provider(s):    MD Tr Polk APRN       To Whom It May Concern:    Angel Leon was seen and treated in our department on 9/25/2021.   Please excuse from work u

## (undated) NOTE — LETTER
Date: 9/30/2020    Patient Name: Norma Gaona          To Whom it may concern: This letter has been written at the patient's request. The above patient was seen at the [facility] for treatment of a medical condition.      This patient sh

## (undated) NOTE — ED AVS SNAPSHOT
Elsy Grande   MRN: [id number]    Department:  [facility] Emergency Department in [place]   Date of Visit:  3/14/2018           Disclosure     Insurance plans vary and the physician(s) referred by the ER may not be covered by your plan.  Please contac tell this physician (or your personal doctor if your instructions are to return to your personal doctor) about any new or lasting problems. The primary care or specialist physician will see patients referred from the [facility] Emergency Department.  Yisel Sanchez